# Patient Record
Sex: FEMALE | Race: WHITE | URBAN - METROPOLITAN AREA
[De-identification: names, ages, dates, MRNs, and addresses within clinical notes are randomized per-mention and may not be internally consistent; named-entity substitution may affect disease eponyms.]

---

## 2017-10-30 ENCOUNTER — ED HISTORICAL/CONVERTED ENCOUNTER (OUTPATIENT)
Dept: OTHER | Age: 52
End: 2017-10-30

## 2018-09-07 ENCOUNTER — ED HISTORICAL/CONVERTED ENCOUNTER (OUTPATIENT)
Dept: OTHER | Age: 53
End: 2018-09-07

## 2021-08-18 ENCOUNTER — HOSPITAL ENCOUNTER (OUTPATIENT)
Dept: PHYSICAL THERAPY | Age: 56
Discharge: HOME | End: 2021-08-18
Payer: COMMERCIAL

## 2021-08-18 VITALS — OXYGEN SATURATION: 98 % | DIASTOLIC BLOOD PRESSURE: 82 MMHG | SYSTOLIC BLOOD PRESSURE: 122 MMHG | HEART RATE: 90 BPM

## 2021-08-18 PROCEDURE — 97161 PT EVAL LOW COMPLEX 20 MIN: CPT

## 2021-08-18 PROCEDURE — 97535 SELF CARE MNGMENT TRAINING: CPT

## 2021-08-18 NOTE — PROGRESS NOTES
Statement of Medical Necessity  Eladio North 1965 Today's Date: 8/18/2021 JORDAN: Lifetime   Payor: MARA HARDING / Plan: [address] [place] [address] Avenue / Product Type: PPO /  ME: TBD  Refills: 2           Diagnosis  []   I97.2 Post-Mastectomy Lymphedema []   I87.2 Venous Insufficiency   []   I89.0 Lymphedema, other secondary  []   I83.019 Venous Stasis Ulcer LE, Right   []   I89.9 Unspecified Lymphatic Disorder []   I83.029 Venous Stasis Ulcer LE, Left   [x]   R60.9 Swelling not relieved by elevation [x]   Q82.0 Hereditary/ Congenital Lymphedema   []   C50.211 Malignant neoplasm of breast, Right []   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   []   C50.212 Malignant neoplasm of breast, Left []   L03.115 LE Cellulitis, Right   []    []   L03.116 LE Cellulitis, Left     Recommended Product for Diagnosis as noted above:  Units Upper Extremity Rt Lt Units Lower Extremity Rt Lt    Compression Multi-Layered Bandages   4 Compression Multi-Layered Bandages x x    Circ-Aid, Ready Wrap, Sigvaris Arm   4 Inelastic binders (Circ-Aid, Farrow)  [x]Foot   [x]Below Knee   []Knee   []Thigh x x    Circ-Aid Ready Wrap, Sigvaris hand    Mauricio Hernandez, Leg, night use      Tribute Arm, night use    Tribute Leg, night use      Mauricio Hernandez Arm, night use    Bebo Sleeve Leg/ Foot, night use      Vasopneumatic Compression Pump  []Arm []Arm w/Shoulder  []Arm w/Vest   1 Vasopneumatic Compression Pump  [x]Leg         [x]Trunk       []Pant x x    Gradiant Compression Sleeves & Gloves  Custom/ RM Arm:    []CCL1 []CCL2 []CCL3  Custom/ RM Glove: []CCL1 []CCL2 []CCL3      Gradient Compression Stockings  Custom/ RM Lower Extremity:   [x]CCL1       [x]CCL2         []CCL3   [x]Knee      [x]Thigh        []Full Length x x    Bebo sleeve arm w/ hand, night use    Tribute Wrap, night use      Compression Bra    Compression Vest          Compression Multi-Layered Bandages     The above patient was referred for treatment of Lymphedema due to the diagnosis indicated above. Lymphedema is a progressive and chronic condition. It may cause acute infections, muscle atrophy, discomfort, and connective tissue fibrosis with irreversible tissue damage, decreased ROM in the affected limb and diminished functional mobility possibly interfering with independence and ability to work. Recurrent infections and wound complications that commonly occur with Lymphedema often require hospitalization and extensive wound care, thus increasing medical costs. The patient has received complete decongestive therapy which includes manual lymphatic drainage, lymphedema specific exercises, compression bandaging, and hygiene/skin care. Goals of therapy are to reduce the edema and prevent re-accumulation of fluid with its complications. It is medically necessary for this patient to have daytime garments to control this condition. They will need 2 sets (one set to wear and one set to wash for adequate skin care and wearing time). Garments must be replaced every 4-6 months for effectiveness. There are no substitutes available that offer acceptable compression treatment for this Lymphedema patient. If further information is requested, please contact our certified lymphedema therapists at [phone].     [] Sam Casey, PT, DPT, [place]OXANNE  [x] Cecille Chappell PT, [facility]  [] Deirdre Ryder, PT, DPT, [facility]      Printed MD Name  MD Signature  Date

## 2021-08-18 NOTE — PROGRESS NOTES
[facility]  Lymphedema Clinic and Cancer Rehabilitation  [address] [place] [address].  [address]  [address]      INITIAL EVALUATION    NAME: Louise Hurst AGE: 49 y.o. GENDER: female  DATE: 8/18/2021  REFERRING PHYSICIAN: Roberto Pike MD  HISTORY AND BACKGROUND: Patient referred to clinic for evaluation and treatment of LE lymphedema. Patient reports doppler study was negative for DVT, with MD note indicating abdominal CT indicating no evidence of iliac vein compression, May Thurner syndrome, or IVC compression. Patient reports greater than 10 years of LE edema, with condition worsening over the past year, working from home. Primary Diagnosis:  · Primary lymphedema (Q82.0)  Other Treatment Diagnoses:   Swelling not relieved by elevation ( R60.9 edema)  Morbid Obesity (E66.01)  Date of Onset: 10+ years  Present Symptoms and Functional Limitations: LE skin tightness, increase in size, negative impact on body image, feelings of frustration all limiting participation in social activities, fit of shoes/clothing. Reports lack of understanding of appropriate management of lymphedema. Lymphedema Life Impact Scale: 15/68; 22% impairment. Past Medical History:   Past Medical History:   Diagnosis Date    Ill-defined condition     Lymphedema     Thyroid disease    No past surgical history on file. Current Medications:  Calcitrate 600+ D oral, Vitamin D3, dicyclomine 10 mg, hydrochlorothiazide 12.5 mg, levothyroxine 25 mcg. No current outpatient medications on file. No current facility-administered medications for this encounter. Allergies:  PCN per MD noted. Prior Level of Function/Social/Work History/Personal factors and/or comorbidities impacting plan of care: Patient reports LE edema for 10+ years, becoming worse over the past year. Patient an , currently working from home due to pandemic.   Patient reports recent weight gain.   Living Situation:  Private residence. Trainable Caregiver?: not at this time. Self-care/ADLs: indep     Mobility: indep   Sleeping Arrangement:  bed   Adaptive Equipment Owned: na  Previous Therapy:  Trial of non-medical grade compression socks, did not bring to appointment today. Compression/Lymphedema Equipment:  Non-medical grade OTC compression socks, purchased online. SUBJECTIVE:   Patient reports recent medical work-up due to increase in LE swelling. Reports doppler study/CT abdominal/pelvic area, results as noted above. Patient reports legs have been swollen for 10 years, however, worse over the past year. Reports leg edema only present during daytime hours, then resolving overnight. States recently swelling not resolving overnight. States she does notice some improvement in LE size with wear of OTC compression socks. Patient reports her mother/grandmother have/had similar swelling. States her mom wears compression stockings. Patients goals for therapy: reduce LE swelling.     OBJECTIVE DATA SUMMARY:   EXAMINATION/PRESENTATION/DECISION MAKING:   Pain:  Pain Scale 1: Numeric (0 - 10)  Pain Intensity 1: 2  Pain Location 1: Leg    Skin and Tissue Assessment:  Dermal Status:  [x]   Intact []  Dry   []  Tenuous [] Flaky   []  Wound/lesion present []  Scars   []  Dermatitis    Texture/Consistency:  [x]  Boggy: foot/groin, L LE>R LE []  Pitting Edema   []  Brawny []  Combination   [x]  Fibrotic/Woody: calf, bilateral LE    Pigmentation/Color Change:  []  Normal []  Hemosiderin   []  Red []  Erythematous   [x]  Hyperpigmented: L LE > R LE []  Hyperlipodermatosclerosis   Anomalies: NA  []  Lymphorrhea []  Vesicles   []  Petechiae []  Warty Vercusis   []  Bullae []  Papilloma   Circulatory:  []  CLOVIS []  Varicosities:   [x]  Pulses: dorsal pedal pulses located with doppler  [x]  Vascular studies ruled out DVT in past   []  DVT History    Nails:  [x]   Normal  []   Fungus  Stemmers Sign: positive, R>L  Photos:      Height:   5'4\"  Weight:   169.8 lbs   BMI:    29.1  (36 or greater: adversely affecting lymphedema)  Volumetric Measurements:   Right:  8312.25 mL Left:  8714. 92 mL   % Difference: 4.84%    (See scanned graph)  Range of Motion: bilateral UE/LE WNL AROM  Strength: bilateral UE/LE grossly 5/5 with MMT  Sensation:  Intact to light touch bilateral LE  Mobility:  Bed/Chair Mobility:  Independent  Transfers:  Independent    Sitting Balance:  good Standing Balance:  good   Gait:  Independent  Wheelchair Mobility:  (Other) na   Endurance:  Intact for daily activities, gait community distances Stairs:  (Other) not assessed. Safety:  Patient is alert and oriented:  X 4   Safety awareness:  intact   Fall Risk?:  low   Patient given written fall prevention handout: Yes   Precautions:  Standard lymphedema precautions to include avoiding blood pressure readings, injections and IVs or other procedures/acts that could lead to broken skin on affected area, and avoiding excessive heat, resistive activity or altitude without compression garment    Functional outcomes measure: LLIS    ?  Other PT/OT Primary Functional Limitations:     - CURRENT STATUS: CJ - 20%-39% impaired, limited or restricted    - GOAL STATUS: CI - 1%-19% impaired, limited or restricted    - D/C STATUS:  ---------------To be determined---------------     Physical Therapy Evaluation Charge Determination   History Examination Presentation Decision-Making   MEDIUM  Complexity : 1-2 comorbidities / personal factors will impact the outcome/ POC  MEDIUM Complexity : 3 Standardized tests and measures addressing body structure, function, activity limitation and / or participation in recreation  MEDIUM Complexity : Evolving with changing characteristics  Other outcome measures LLIS  LOW       Based on the above components, the patient evaluation is determined to be of the following complexity level: LOW      Evaluation Time: 8:40-9:00 am    TREATMENT PROVIDED:   1. Treatment description: The patient was educated regarding the role and function of the lymphatic system, pathophysiology of primary lymphedema, and instructed in the lymphedema management protocol of complete decongestive therapy (CDT). This includes skin care to prevent breakdown or infection, lymphedema exercises, custom compression therapy options (bandaging, compression garments, compression pump, Jacquelin Shackle, [employer], [employer], etc. as needed), and decongestion with manual lymphatic drainage as indicated. We discussed the need for consistent compression system for lymphedema management. Patient advised custom flat knit compression stockings for daytime wear indicated after LE decongestion due to foot/ankle lymphedema involvement. Patient advised vasopneumatic pump medically necessary for effective home management upon completion of phase II CDT. Skin care education was performed,applying low pH lotion to extremity using upward strokes to stimulate lymphatic vessels. Pt was given information regarding obtaining bandaging supplies. Patient provided with information folder including further information regarding lymphedema. Treatment time: 9:00-9:40 am  Minutes: 40       Self Care 3      ASSESSMENT:   Seth Parra is a 49 y.o. female who presents with stage II lymphedema. Patient presents with foot/ankle involvement, positive Stemmer's sign bilateral LE, with presentation and history of LE edema, and family history of lymphedema being consistent with primary lymphedema, with swelling present for more than 10 years, progressively worse over the past year. Patient advised learning compression bandaging technique may allow her to remove bandaging at home for showers.   Patient will benefit from complete decongestive therapy (CDT) including manual lymphatic drainage (MLD) technique, short-stretch textile bandages/compression system to decongest limb, and kinesiotaping as appropriate. Patient will receive instruction in proper skin care to recognize signs/symptoms of and prevent infection, therapeutic exercise, and self-MLD for independent home program and restorative lymphatic performance. This care is medically necessary due to the infection risk with lymphedema, and to improve functional activities. CDT is necessary to resolve swelling to allow patient to return to wearing normal clothes/footwear, and prevent worsening of symptoms, such as venous stasis ulcerations, infections, or hospitalizations. Patient will be independent with home program strategies to allow improved ADL ability and mobility and to allow patient to return to greatest functional independence. Rehabilitation potential is considered to be Good. Factors which may influence rehabilitation potential include patient demonstrating ROM/Strength WNL. Patient will benefit from 10-15 physical therapy visits over 12 weeks to optimize improvement in these areas. PLAN OF CARE:   Recommendations and Planned Interventions:  Manual lymph drainage/complete decongestive therapy  Multi-layer compression bandaging (short-stretch)  Compression garment fitting/provision  Lymphedema therapeutic exercise  Education in skin care and lymphedema precautions  Self-MLD education per home program  Self-bandaging education per home program     GOALS  Short term goals  Time frame: 6 weeks  1. Instruct the patient to be independent with proper skin care to prevent future skin breakdown and decrease the potential risk for infections that are associated with Lymphedema. 2. Patient will be independent with a personal lymphedema exercise program to assist with the lymphatic flow and reduce limb volume by 400-600 mL . 3. Patient will understand the signs and symptoms of acute infection. Long term goals  Time frame: 12 weeks  1.    Patient will have knowledge of the compression options and acquire a safe and appropriate daytime and night time compression system to prevent  re-accumulation of fluid. 2.  Patient or family will be able to don/doff garments independently. Garment  system effectiveness will be evaluated prior to discharge for better long-term  management and outcomes. 3.  Improvement in functional outcomes measure by 10% to allow for overall improvement in mobility with reduced pain. 4.   To transition patient to independent restorative phase of CDT. Patient has participated in goal setting and agrees to work toward plan of care. Patient was instructed to call if questions or concerns arise. Thank you for this referral.  Annalisa Abraham, PT, CLT-CRISTIANA   Time Calculation: 60 mins    TREATMENT PLAN EFFECTIVE DATES:   8/18/2021 TO 11/14/2021  I have read the above plan of care for [address] I certify the above prescribed services are required by this patient and are medically necessary.   The above plan of care has been developed in conjunction with the lymphedema/physical therapist.       Physician Signature: ____________________________________Date:______________

## 2021-09-14 ENCOUNTER — HOSPITAL ENCOUNTER (OUTPATIENT)
Dept: PHYSICAL THERAPY | Age: 56
Discharge: HOME | End: 2021-09-14
Payer: COMMERCIAL

## 2021-09-14 PROCEDURE — 97140 MANUAL THERAPY 1/> REGIONS: CPT

## 2021-09-14 NOTE — PROGRESS NOTES
LYMPHEDEMA PT DAILY TREATMENT NOTE - [facility] 2-15    Patient Name: Eladio North  AINB:[id number]  : 1965  [x]  Patient  Verified  Payor: Cliff Recinos / Plan: [address] [place] [address] / Product Type: PPO /    In time:8:45 am  Out time:9:40 am  Total Treatment Time (min): 55  Total Timed Codes (min): 55  1:1 Treatment Time ( only): 54   Visit #:  2    Treatment Area: Primary lymphedema, (Q82.0)    SUBJECTIVE  Pain Level (0-10 scale): 2/10, bilateral LE  Any medication changes, allergies to medications, adverse drug reactions, diagnosis change, or new procedure performed?: [x] No    [] Yes (see summary sheet for update)  Subjective functional status/changes:   [] No changes reported  Patient arrives with compression bandaging supplies obtained to initiate treatment. No changes in medical history. Agreeable to proceeding with treatment. OBJECTIVE     min Therapeutic Exercise:  [] [place] Exercises [x] Remedial Lymphedema Exercise Program--to continue ankle pumps/circumduction, toe flex/ext/abd/add   Rationale: Activate muscle pump to improve lymphatic fluid movement and decrease swelling to improve the patients ability to perform ADL and IADL skills and prevent worsening of swelling over time. 55 min Manual Therapy  Manual Lymphatic Drainage (MLD):  Area to decongest: LE: bilateral   Sequence used and effectiveness: Primary sequence L LE   Skin/wound care/debridement: Reviewed skin care principles:   Performed skin care with low pH lotion following manual lymph principles. Skin care products  Hygiene  Prevention of cellulitis   Applied multi-layer compression bandaging to: Patient arrived with adequate bandage in place that was applied by caregiver. bilateral  lower extremity: below knee    The following multi-layer bandages were applied:  Protouch    Padding layer:  Fleece    Foam:  12 cm roll    Short stretch bandages:   6 cm  8 cm  10 cm     Compression bandaging instructions:  1. Compression bandaging will be applied twice a week by therapist in clinic, with adjustments to be made at home as indicated if bandaging becomes loose or uncomfortable. 2. If tolerated, remain bandaged between appointments with therapist, removing under the following conditionsDO NOT WAIT FOR A RETURN PHONE Ally Eid:    -Numbness/tingling in extremity different from what you have experienced without the bandages in place.  -Compromise in circulation, monitoring blood refill into toes after applying gentle pressure to toes.  -Onset of pain in extremity that is sudden and severe in nature. -Redness, warmth in limb, and/or fever, flu-like symptoms, which may indicate infection. If this occurs, call your doctor right away. If you note a sudden increase in swelling in extremity, with or without redness/warmth, go to the emergency room as soon as possible to have blood clot ruled out. 3. Compression bandaging supplies that can be laundered are the brown compression wraps and any foam applied for padding. Launder in washer/dryer with NO fabric softener, bleach, woolite. Dry on a low heat. 4. Once compression garments are ordered, compression bandaging will be continued until garments arrive for fitting. This process can take several weeks. Rationale: decrease pain, increase tissue extensibility and decrease edema  to improve the patients ability to perform work-related, daily routine, and leisure activities without pain. na   Vasopneumatic Device:    Body Part: [] R [] L [] Bilateral  Pressure: [] Decreased [] Normal [] Increased  Treatment Cycles: [] 1  [] 2  [] 3   Rationale: To improve lymphatic fluid movement and decrease swelling to improve the patients ability to perform ADL and IADL skills and prevent worsening of swelling over time.           With   [] TE   [] TA   [x] MT   [] SC   [] other: Patient Education: [x] Review HEP    [x] MLD Patient Education Reviewed with patient, as well as demonstration and instruction during MLD portion of the session. Continued education in self MLD technique with bathing and skin care. [] Progressed/Changed HEP based on:   [] positioning   [] Kinesiotape   [] Skin care   [] wound care   [] other:   [x] Patient/caregiver in multi-layer bandaging donning principles. , Precautions. and Handout provided. Patient / caregiver re-demonstrated bandaging. [] Yes  [x] No  Compression bandaging/garment precautions reviewed: [x] Yes  [] No         ASSESSMENT/Changes in Function:     Patient tolerated MLD/MLB well, with L LE MLD sequence performed, bilateral knee high MLB applied in clinic today. Patient provided with bandaging instructions as noted above. Patient to continue LE HEP 2x/day, 10 reps, as noted above. Plan to progress treatment based on patient tolerance. Patient unable to don shoes she purchased in larger size due to reduced depth of shoe. Patient agreed to fitting non-skid socks today, with information provided for patient to purchase post op shoes for follow up treatments. Patient will continue to benefit from skilled PT services to  address swelling, analyze compression product fit and use, instruct in home lymphedema management program, measure for compression products and modify and progress therapeutic interventions to attain remaining goals. [x]  See Plan of Care  []  See progress note/recertification  []  See Discharge Summary         Progress towards goals / Updated goals:  Initiated MLD/MLB in clinic today. PLAN  [x]  Upgrade activities as tolerated     [x]  Continue plan of care  []  Update interventions per flow sheet       []  Discharge due to:_  [x]  Other: assess response to MLD/MLB performed today.     Andrea Conklin, PT, [facility] 9/14/2021

## 2021-09-17 ENCOUNTER — HOSPITAL ENCOUNTER (OUTPATIENT)
Dept: PHYSICAL THERAPY | Age: 56
Discharge: HOME | End: 2021-09-17
Payer: COMMERCIAL

## 2021-09-17 PROCEDURE — 97161 PT EVAL LOW COMPLEX 20 MIN: CPT

## 2021-09-17 PROCEDURE — 97530 THERAPEUTIC ACTIVITIES: CPT

## 2021-09-17 PROCEDURE — 97140 MANUAL THERAPY 1/> REGIONS: CPT

## 2021-09-17 NOTE — PROGRESS NOTES
LYMPHEDEMA PT DAILY TREATMENT NOTE - [facility] 2-15    Patient Name: Facundo Varner  Date:2021  : 1965  [x]  Patient  Verified  Payor: Connie Sheridan / Plan: [address] [place] [address] / Product Type: PPO /    In time:8:34 am  Out time:9:38 am  Total Treatment Time (min): 64  Total Timed Codes (min): 64  1:1 Treatment Time ( only): 64   Visit #:  3    Treatment Area: Primary lymphedema, (Q82.0)    SUBJECTIVE  Pain Level (0-10 scale): 2/10, bilateral LE  Any medication changes, allergies to medications, adverse drug reactions, diagnosis change, or new procedure performed?: [x] No    [] Yes (see summary sheet for update)  Subjective functional status/changes:   [] No changes reported  Patient arrives with compression bandaging in place, knee high, as applied previous visit. Patient reports for aspect of bandaging coming loose in places. Agreeable to proceeding with treatment. OBJECTIVE     min Therapeutic Exercise:  [] Tanvir Speedy Exercises [x] Remedial Lymphedema Exercise Program--to continue ankle pumps/circumduction, toe flex/ext/abd/add   Rationale: Activate muscle pump to improve lymphatic fluid movement and decrease swelling to improve the patients ability to perform ADL and IADL skills and prevent worsening of swelling over time. 64 min Manual Therapy  Manual Lymphatic Drainage (MLD):  Area to decongest: LE: bilateral   Sequence used and effectiveness: Primary sequence L LE, full LE. Did not activate IA anastomoses secondary to c/o hand swelling intermittently. Skin/wound care/debridement: Reviewed skin care principles:   Performed skin care with low pH lotion following manual lymph principles. Skin care products  Hygiene  Prevention of cellulitis   Applied multi-layer compression bandaging to: Patient arrived with adequate bandage in place that was applied by caregiver.   bilateral  lower extremity: below knee    The following multi-layer bandages were applied:  Protouch    Transelast-toe wraps bilateral    Padding layer:  Fleece    Foam:  12 cm roll    Short stretch bandages:   6 cm  8 cm  10 cm     Compression bandaging instructions:  1. Compression bandaging will be applied twice a week by therapist in clinic, with adjustments to be made at home as indicated if bandaging becomes loose or uncomfortable. 2. If tolerated, remain bandaged between appointments with therapist, removing under the following conditionsDO NOT WAIT FOR A RETURN PHONE Ally Eid:    -Numbness/tingling in extremity different from what you have experienced without the bandages in place.  -Compromise in circulation, monitoring blood refill into toes after applying gentle pressure to toes.  -Onset of pain in extremity that is sudden and severe in nature. -Redness, warmth in limb, and/or fever, flu-like symptoms, which may indicate infection. If this occurs, call your doctor right away. If you note a sudden increase in swelling in extremity, with or without redness/warmth, go to the emergency room as soon as possible to have blood clot ruled out. 3. Compression bandaging supplies that can be laundered are the brown compression wraps and any foam applied for padding. Launder in washer/dryer with NO fabric softener, bleach, woolite. Dry on a low heat. 4. Once compression garments are ordered, compression bandaging will be continued until garments arrive for fitting. This process can take several weeks. Patient provided with information to obtain Leg gloves by [employer] to allow her to cover bandaging while showering. Item available on [employer] Rationale: decrease pain, increase tissue extensibility and decrease edema  to improve the patients ability to perform work-related, daily routine, and leisure activities without pain. na   Vasopneumatic Device:    Body Part: [] R [] L [] Bilateral  Pressure: [] Decreased [] Normal [] Increased  Treatment Cycles: [] 1  [] 2  [] 3   Rationale:  To improve lymphatic fluid movement and decrease swelling to improve the patients ability to perform ADL and IADL skills and prevent worsening of swelling over time. With   [] TE   [] TA   [x] MT   [] SC   [] other: Patient Education: [x] Review HEP    [x] MLD Patient Education Reviewed with patient, as well as demonstration and instruction during MLD portion of the session. Continued education in self MLD technique with bathing and skin care. [] Progressed/Changed HEP based on:   [] positioning   [] Kinesiotape   [] Skin care   [] wound care   [] other:   [x] Patient/caregiver in multi-layer bandaging donning principles. , Precautions. and Handout provided. Patient / caregiver re-demonstrated bandaging. [] Yes  [x] No  Compression bandaging/garment precautions reviewed: [x] Yes  [] No         ASSESSMENT/Changes in Function:     Patient tolerated MLD/MLB well, with L LE MLD sequence performed, bilateral knee high MLB applied in clinic today. Patient provided with bandaging instructions previous visit as noted above. Patient to continue LE HEP 2x/day, 10 reps, as noted above. Provided with information to obtain Leg Gloves to use to cover bandaging with showers. Plan to progress treatment based on patient tolerance. Patient has obtained post-op shoes to accommodate MLB, with patient wearing shoes into clinic today. Patient agreed to fitting non-skid socks today, with information provided for patient to purchase post op shoes for follow up treatments. Patient will continue to benefit from skilled PT services to  address swelling, analyze compression product fit and use, instruct in home lymphedema management program, measure for compression products and modify and progress therapeutic interventions to attain remaining goals.      [x]  See Plan of Care  []  See progress note/recertification  []  See Discharge Summary         Progress towards goals / Updated goals:  Initiated MLD/MLB in clinic today.    PLAN  [x]  Upgrade activities as tolerated     [x]  Continue plan of care  []  Update interventions per flow sheet       []  Discharge due to:_  [x]  Other: assess response to MLD/MLB performed today.     Chris Jones PT, CLT-CRISTIANA 9/17/2021

## 2021-09-21 ENCOUNTER — HOSPITAL ENCOUNTER (OUTPATIENT)
Dept: PHYSICAL THERAPY | Age: 56
Discharge: HOME | End: 2021-09-21
Payer: COMMERCIAL

## 2021-09-21 PROCEDURE — 97140 MANUAL THERAPY 1/> REGIONS: CPT

## 2021-09-21 NOTE — PROGRESS NOTES
LYMPHEDEMA PT DAILY TREATMENT NOTE - [facility] -15    Patient Name: Brooks Steele  Date:2021  : 1965  [x]  Patient  Verified  Payor: Emmanuel Pa / Plan: [address] [place] [address] / Product Type: PPO /    In time:8:34 am  Out time:9:38 am  Total Treatment Time (min): 64  Total Timed Codes (min): 64  1:1 Treatment Time ( only): 64   Visit #:  4    Treatment Area: Primary lymphedema, (Q82.0)    SUBJECTIVE  Pain Level (0-10 scale): 1/10, bilateral LE  Any medication changes, allergies to medications, adverse drug reactions, diagnosis change, or new procedure performed?: [x] No    [] Yes (see summary sheet for update)  Subjective functional status/changes:   [] No changes reported  Patient arrives with compression bandaging in place, knee high, as applied previous visit. Patient reports toe wraps loose and removed yesterday. Agreeable to proceeding with treatment. OBJECTIVE     min Therapeutic Exercise:  [] Rumalda Million Exercises [x] Remedial Lymphedema Exercise Program--to continue ankle pumps/circumduction, toe flex/ext/abd/add   Rationale: Activate muscle pump to improve lymphatic fluid movement and decrease swelling to improve the patients ability to perform ADL and IADL skills and prevent worsening of swelling over time. 64 min Manual Therapy  Manual Lymphatic Drainage (MLD):  Area to decongest: LE: bilateral   Sequence used and effectiveness: Primary sequence Bilateral LE, full LE. Did not activate IA anastomoses secondary to c/o hand swelling intermittently. Skin/wound care/debridement: Reviewed skin care principles:   Performed skin care with low pH lotion following manual lymph principles. Skin care products  Hygiene  Prevention of cellulitis   Applied multi-layer compression bandaging to: Patient arrived with adequate bandage in place that was applied by caregiver.   bilateral  lower extremity: below knee    The following multi-layer bandages were applied:  Protouch    Transelast-toe wraps bilateral    Padding layer:  Fleece    Foam:  12 cm roll    Short stretch bandages:   6 cm  8 cm  10 cm     Compression bandaging instructions:  1. Compression bandaging will be applied twice a week by therapist in clinic, with adjustments to be made at home as indicated if bandaging becomes loose or uncomfortable. 2. If tolerated, remain bandaged between appointments with therapist, removing under the following conditionsDO NOT WAIT FOR A RETURN PHONE Ally Eid:    -Numbness/tingling in extremity different from what you have experienced without the bandages in place.  -Compromise in circulation, monitoring blood refill into toes after applying gentle pressure to toes.  -Onset of pain in extremity that is sudden and severe in nature. -Redness, warmth in limb, and/or fever, flu-like symptoms, which may indicate infection. If this occurs, call your doctor right away. If you note a sudden increase in swelling in extremity, with or without redness/warmth, go to the emergency room as soon as possible to have blood clot ruled out. 3. Compression bandaging supplies that can be laundered are the brown compression wraps and any foam applied for padding. Launder in washer/dryer with NO fabric softener, bleach, woolite. Dry on a low heat. 4. Once compression garments are ordered, compression bandaging will be continued until garments arrive for fitting. This process can take several weeks. Patient provided with information to obtain Leg gloves by [employer] to allow her to cover bandaging while showering. Item available on [employer] Rationale: decrease pain, increase tissue extensibility and decrease edema  to improve the patients ability to perform work-related, daily routine, and leisure activities without pain. na   Vasopneumatic Device:    Body Part: [] R [] L [] Bilateral  Pressure: [] Decreased [] Normal [] Increased  Treatment Cycles: [] 1  [] 2  [] 3   Rationale:  To improve lymphatic fluid movement and decrease swelling to improve the patients ability to perform ADL and IADL skills and prevent worsening of swelling over time. With   [] TE   [] TA   [x] MT   [] SC   [] other: Patient Education: [x] Review HEP    [x] MLD Patient Education Reviewed with patient, as well as demonstration and instruction during MLD portion of the session. Continued education in self MLD technique with bathing and skin care. [] Progressed/Changed HEP based on:   [] positioning   [] Kinesiotape   [] Skin care   [] wound care   [] other:   [x] Patient/caregiver in multi-layer bandaging donning principles. , Precautions. and Handout provided. Patient / caregiver re-demonstrated bandaging. [] Yes  [x] No  Compression bandaging/garment precautions reviewed: [x] Yes  [] No     Affected Side: bilateral LE   Left (cm) Right (cm)   5th Tuberosity 22.7    22.4      Ankle 27.7    26      Calf 38.3    37.4      Mid Knee             Thigh             Groin             Length                 ASSESSMENT/Changes in Function:     Patient tolerated MLD/MLB well, with L LE MLD sequence performed, bilateral knee high MLB applied in clinic today. Patient provided with bandaging instructions previous visit as noted above. Patient to continue LE HEP 2x/day, 10 reps, as noted above. Provided with information to obtain Leg Gloves to use to cover bandaging with showers. Patient ordered stating scheduled to arrive today. Plan to progress treatment based on patient tolerance. Patient has obtained post-op shoes to accommodate MLB, with patient wearing shoes into clinic today. Patient arrives with appropriate foot wear to accommodate bandaging.     Patient will continue to benefit from skilled PT services to  address swelling, analyze compression product fit and use, instruct in home lymphedema management program, measure for compression products and modify and progress therapeutic interventions to attain remaining goals. [x]  See Plan of Care  []  See progress note/recertification  []  See Discharge Summary         Progress towards goals / Updated goals:  Initiated MLD/MLB in clinic today. PLAN  [x]  Upgrade activities as tolerated     [x]  Continue plan of care  []  Update interventions per flow sheet       []  Discharge due to:_  [x]  Other: assess response to MLD/MLB performed today.     Khadra Nunez PT, CLT-CRISTIANA 9/21/2021

## 2021-09-24 ENCOUNTER — HOSPITAL ENCOUNTER (OUTPATIENT)
Dept: PHYSICAL THERAPY | Age: 56
Discharge: HOME | End: 2021-09-24
Payer: COMMERCIAL

## 2021-09-24 PROCEDURE — 97530 THERAPEUTIC ACTIVITIES: CPT

## 2021-09-24 PROCEDURE — 97140 MANUAL THERAPY 1/> REGIONS: CPT

## 2021-09-24 NOTE — PROGRESS NOTES
LYMPHEDEMA PT DAILY TREATMENT NOTE - [facility] 2-15    Patient Name: Edith Finney  Date:2021  : 1965  [x]  Patient  Verified  Payor: Alisson Chavez / Plan: [address] [place] [address] / Product Type: PPO /    In time:8:40 am  Out time: 10:00 am  Total Treatment Time (min): 80  Total Timed Codes (min): 80  1:1 Treatment Time ([facility] only):80  Visit #:  5    Treatment Area: Primary lymphedema, (Q82.0)    SUBJECTIVE  Pain Level (0-10 scale): 1/10, bilateral LE  Any medication changes, allergies to medications, adverse drug reactions, diagnosis change, or new procedure performed?: [x] No    [] Yes (see summary sheet for update)  Subjective functional status/changes:   [] No changes reported  Patient arrives with compression bandaging in place, knee high, as applied previous visit. Patient reports bandaging tolerated well. Please with treatment response. Agreeable to proceeding with treatment including . OBJECTIVE     min Therapeutic Exercise:  [] Laverle Hotter Exercises [x] Remedial Lymphedema Exercise Program--to continue ankle pumps/circumduction, toe flex/ext/abd/add   Rationale: Activate muscle pump to improve lymphatic fluid movement and decrease swelling to improve the patients ability to perform ADL and IADL skills and prevent worsening of swelling over time. 20  60 min  min Therapeutic activity  Manual Therapy  Manual Lymphatic Drainage (MLD):  Area to decongest: LE: bilateral   Sequence used and effectiveness:  Manual therapy  Primary sequence Bilateral LE, full LE. Did not activate IA anastomoses secondary to c/o hand swelling intermittently. Skin/wound care/debridement: Reviewed skin care principles:   Performed skin care with low pH lotion following manual lymph principles. Skin care products  Hygiene  Prevention of cellulitis   Applied multi-layer compression bandaging to: Patient arrived with adequate bandage in place that was applied by caregiver.   bilateral  lower extremity: below knee    The following multi-layer bandages were applied:  Protouch    Transelast-toe wraps bilateral    Padding layer:  Fleece    Foam:  12 cm roll    Short stretch bandages:   6 cm  8 cm  10 cm     Compression bandaging instructions:  1. Compression bandaging will be applied twice a week by therapist in clinic, with adjustments to be made at home as indicated if bandaging becomes loose or uncomfortable. 2. If tolerated, remain bandaged between appointments with therapist, removing under the following conditionsDO NOT WAIT FOR A RETURN PHONE Ally Eid:    -Numbness/tingling in extremity different from what you have experienced without the bandages in place.  -Compromise in circulation, monitoring blood refill into toes after applying gentle pressure to toes.  -Onset of pain in extremity that is sudden and severe in nature. -Redness, warmth in limb, and/or fever, flu-like symptoms, which may indicate infection. If this occurs, call your doctor right away. If you note a sudden increase in swelling in extremity, with or without redness/warmth, go to the emergency room as soon as possible to have blood clot ruled out. 3. Compression bandaging supplies that can be laundered are the brown compression wraps and any foam applied for padding. Launder in washer/dryer with NO fabric softener, bleach, woolite. Dry on a low heat. 4. Once compression garments are ordered, compression bandaging will be continued until garments arrive for fitting. This process can take several weeks. Patient received Leg gloves by [employer] to allow her to cover bandaging while showering, stating they are working well. Therapeutic activity:         Completed measurements for custom flat knit knee high stockings/toe cap, Juzo [id number], beige silver/black.   Patient also requesting CT stockings in black which she will pay for out of pocket for more prompt deliver to allow her to transition to home management as soon as possible. OOP garment order sent to . Additional stockings to be filed with insurance sent to [facility]. Rationale: decrease pain, increase tissue extensibility and decrease edema  to improve the patients ability to perform work-related, daily routine, and leisure activities without pain. na   Vasopneumatic Device:    Body Part: [] R [] L [] Bilateral  Pressure: [] Decreased [] Normal [] Increased  Treatment Cycles: [] 1  [] 2  [] 3   Rationale: To improve lymphatic fluid movement and decrease swelling to improve the patients ability to perform ADL and IADL skills and prevent worsening of swelling over time. With   [] TE   [x] TA   [x] MT   [] SC   [] other: Patient Education: [x] Review HEP    [x] MLD Patient Education Reviewed with patient, as well as demonstration and instruction during MLD portion of the session. Continued education in self MLD technique with bathing and skin care. [] Progressed/Changed HEP based on:   [] positioning   [] Kinesiotape   [] Skin care   [] wound care   [] other:   [x] Patient/caregiver in multi-layer bandaging donning principles. , Precautions. and Handout provided. Patient / caregiver re-demonstrated bandaging. [] Yes  [] No  Compression bandaging/garment precautions reviewed: [x] Yes  [] No     See scanned compression garment order. ASSESSMENT/Changes in Function:     Patient tolerated MLD/MLB well, with L LE MLD sequence performed, bilateral knee high MLB applied in clinic today. Patient provided with bandaging instructions previous visit as noted above. Patient to continue LE HEP 2x/day, 10 reps, as noted above. Patient showering with successful use of Leg gloves. Plan to progress treatment based on patient tolerance. Patient has obtained more comfortable shoes to accommodate MLB, with patient wearing shoes into clinic today. Patient arrives with appropriate foot wear to accommodate bandaging.  Completed measurements for custom flat knit compression stockings/toe caps, with information sent to [facility] for benefits check regarding vasopneumatic pump for home use, with patient in agreement with this today. Patient will continue to benefit from skilled PT services to  address swelling, analyze compression product fit and use, instruct in home lymphedema management program, measure for compression products and modify and progress therapeutic interventions to attain remaining goals. [x]  See Plan of Care  []  See progress note/recertification  []  See Discharge Summary         Progress towards goals / Updated goals:  GOALS  Short term goals  Time frame: 6 weeks  1. Instruct the patient to be independent with proper skin care to prevent future skin breakdown and decrease the potential risk for infections that are associated with Lymphedema. 9/24/2021 ongoing. 2. Patient will be independent with a personal lymphedema exercise program to assist with the lymphatic flow and reduce limb volume by 400-600 mL . 3. Patient will understand the signs and symptoms of acute infection. Long term goals  Time frame: 12 weeks  1. Patient will have knowledge of the compression options and acquire a safe and       appropriate daytime and night time compression system to prevent             re-accumulation of fluid. 9/24/2020 Completed measurements for custom flat knit compression garments as noted above. 2.  Patient or family will be able to don/doff garments independently. Garment   system effectiveness will be evaluated prior to discharge for better long-term  management and outcomes. 3.  Improvement in functional outcomes measure by 10% to allow for overall improvement in mobility with reduced pain. 4.   To transition patient to independent restorative phase of CDT.      PLAN  [x]  Upgrade activities as tolerated     [x]  Continue plan of care  []  Update interventions per flow sheet       []  Discharge due to:_  [x]  Other: assess response to MLD/MLB performed today.     Cecille Age, PT, CLT-CRISTIANA 9/24/2021

## 2021-09-28 ENCOUNTER — HOSPITAL ENCOUNTER (OUTPATIENT)
Dept: PHYSICAL THERAPY | Age: 56
Discharge: HOME | End: 2021-09-28
Payer: COMMERCIAL

## 2021-09-28 PROCEDURE — 97140 MANUAL THERAPY 1/> REGIONS: CPT

## 2021-09-28 PROCEDURE — 97016 VASOPNEUMATIC DEVICE THERAPY: CPT

## 2021-09-28 NOTE — PROGRESS NOTES
LYMPHEDEMA PT DAILY TREATMENT NOTE - [facility] -15    Patient Name: eBau Hu  Date:2021  : 1965  [x]  Patient  Verified  Payor: Brooke Brito / Plan: [address] [place] [address] / Product Type: PPO /    In time:8:35 am  Out time: 9:35 am  Total Treatment Time (min): 60  Total Timed Codes (min): 60  1:1 Treatment Time ( only):60  Visit #:  6    Treatment Area: Primary lymphedema, (Q82.0)    SUBJECTIVE  Pain Level (0-10 scale): 1/10, bilateral LE  Any medication changes, allergies to medications, adverse drug reactions, diagnosis change, or new procedure performed?: [x] No    [] Yes (see summary sheet for update)  Subjective functional status/changes:   [] No changes reported  Patient arrives with compression bandaging in place, knee high, as applied previous visit. Patient reports bandaging tolerated well. Please with treatment response. Agreeable to proceeding with treatment including . OBJECTIVE     min Therapeutic Exercise:  [] Dave Kim Exercises [x] Remedial Lymphedema Exercise Program--to continue ankle pumps/circumduction, toe flex/ext/abd/add   Rationale: Activate muscle pump to improve lymphatic fluid movement and decrease swelling to improve the patients ability to perform ADL and IADL skills and prevent worsening of swelling over time. 25   min Manual Therapy  Manual Lymphatic Drainage (MLD):  Area to decongest: LE: bilateral   Sequence used and effectiveness:  Manual therapy  DEFERRED due to pump trial in clinic. Primary sequence Bilateral LE, full LE. Did not activate IA anastomoses secondary to c/o hand swelling intermittently. Skin/wound care/debridement: Reviewed skin care principles:   Performed skin care with low pH lotion following manual lymph principles. Skin care products  Hygiene  Prevention of cellulitis   Applied multi-layer compression bandaging to: Patient arrived with adequate bandage in place that was applied by caregiver.   bilateral  lower extremity: below knee    The following multi-layer bandages were applied:  Protouch    Transelast-toe wraps bilateral    Padding layer:  Fleece    Foam:  12 cm roll    Short stretch bandages:   6 cm  8 cm  10 cm     Compression bandaging instructions:  1. Compression bandaging will be applied twice a week by therapist in clinic, with adjustments to be made at home as indicated if bandaging becomes loose or uncomfortable. 2. If tolerated, remain bandaged between appointments with therapist, removing under the following conditionsDO NOT WAIT FOR A RETURN PHONE Ally Eid:    -Numbness/tingling in extremity different from what you have experienced without the bandages in place.  -Compromise in circulation, monitoring blood refill into toes after applying gentle pressure to toes.  -Onset of pain in extremity that is sudden and severe in nature. -Redness, warmth in limb, and/or fever, flu-like symptoms, which may indicate infection. If this occurs, call your doctor right away. If you note a sudden increase in swelling in extremity, with or without redness/warmth, go to the emergency room as soon as possible to have blood clot ruled out. 3. Compression bandaging supplies that can be laundered are the brown compression wraps and any foam applied for padding. Launder in washer/dryer with NO fabric softener, bleach, woolite. Dry on a low heat. 4. Once compression garments are ordered, compression bandaging will be continued until garments arrive for fitting. This process can take several weeks. Patient received Leg gloves by [employer] to allow her to cover bandaging while showering, stating they are working well. Vasopneumatic pump:     Previous visit:   Completed measurements for custom flat knit knee high stockings/toe cap, [employer] 3022/3022SV, beige silver/black.   Patient also requesting CT stockings in black which she will pay for out of pocket for more prompt deliver to allow her to transition to home management as soon as possible. OOP garment order sent to . Additional stockings to be filed with insurance sent to [facility]. Rationale: decrease pain, increase tissue extensibility and decrease edema  to improve the patients ability to perform work-related, daily routine, and leisure activities without pain. 35 minutes   Vasopneumatic Device:    Body Part: [] R [x] L [] Bilateral  Pressure: [] Decreased [x] Normal [] Increased  Treatment Cycles: [x] 1  [] 2  [] 3     Vasopneumatic pump trial completed in clinic with [employer]. Patient had failed trial of basic pump, with trial of Flexitouch pump. Patient tolerated treatment well. Vasopneumatic pump for home use is medically necessary for patient to effectively manage LE lymphedema. Rationale: To improve lymphatic fluid movement and decrease swelling to improve the patients ability to perform ADL and IADL skills and prevent worsening of swelling over time. With   [] TE   [] TA   [x] MT   [] SC   [x] other: Patient Education: [x] Review HEP    [x] MLD Patient Education Reviewed with patient, as well as demonstration and instruction during MLD portion of the session. Continued education in self MLD technique with bathing and skin care. [] Progressed/Changed HEP based on:   [] positioning   [] Kinesiotape   [] Skin care   [] wound care   [] other:   [x] Patient/caregiver in multi-layer bandaging donning principles. , Precautions. and Handout provided. Patient / caregiver re-demonstrated bandaging. [] Yes  [] No  Compression bandaging/garment precautions reviewed: [x] Yes  [] No     See scanned compression garment order. Pain post treatment: 0/10    ASSESSMENT/Changes in Function:     Patient tolerated Flexitouch trial and MLB well. Patient provided with bandaging instructions previous visit as noted above. Patient to continue LE HEP 2x/day, 10 reps, as noted above. Patient showering with successful use of Leg gloves.  Plan to progress treatment based on patient tolerance. Patient has obtained more comfortable shoes to accommodate MLB, with patient wearing shoes into clinic today. Completed measurements for custom flat knit compression stockings/toe caps previous visit. Patient reports making payment over the weekend for 1 pair of custom flat knit stockings to allow her to transition to phase I CDT while awaiting insurance authorization for additional stockings. Patient will continue to benefit from skilled PT services to  address swelling, analyze compression product fit and use, instruct in home lymphedema management program, measure for compression products and modify and progress therapeutic interventions to attain remaining goals. [x]  See Plan of Care  []  See progress note/recertification  []  See Discharge Summary         Progress towards goals / Updated goals:  GOALS  Short term goals  Time frame: 6 weeks  1. Instruct the patient to be independent with proper skin care to prevent future skin breakdown and decrease the potential risk for infections that are associated with Lymphedema. 9/24/2021 ongoing. 2. Patient will be independent with a personal lymphedema exercise program to assist with the lymphatic flow and reduce limb volume by 400-600 mL . 3. Patient will understand the signs and symptoms of acute infection. Long term goals  Time frame: 12 weeks  1. Patient will have knowledge of the compression options and acquire a safe and       appropriate daytime and night time compression system to prevent             re-accumulation of fluid. 9/24/2020 Completed measurements for custom flat knit compression garments as noted above. 2.  Patient or family will be able to don/doff garments independently. Garment   system effectiveness will be evaluated prior to discharge for better long-term  management and outcomes.   3.  Improvement in functional outcomes measure by 10% to allow for overall improvement in mobility with reduced pain. 4.   To transition patient to independent restorative phase of CDT. PLAN  [x]  Upgrade activities as tolerated     [x]  Continue plan of care  []  Update interventions per flow sheet       []  Discharge due to:_  [x]  Other: assess response to MLD/MLB performed today.     Pollo Millan, PT, CLT-[facility]NA 9/28/2021

## 2021-10-01 ENCOUNTER — HOSPITAL ENCOUNTER (OUTPATIENT)
Dept: PHYSICAL THERAPY | Age: 56
Discharge: HOME | End: 2021-10-01
Payer: COMMERCIAL

## 2021-10-01 PROCEDURE — 97140 MANUAL THERAPY 1/> REGIONS: CPT

## 2021-10-01 NOTE — PROGRESS NOTES
LYMPHEDEMA PT DAILY TREATMENT NOTE - [facility] -15    Patient Name: Austin Silvestre  Date:10/1/2021  : 1965  [x]  Patient  Verified  Payor: Darvin Healy / Plan: [address] [place] [address] / Product Type: PPO /    In time:8:45 am  Out time: 9:40 am  Total Treatment Time (min): 55  Total Timed Codes (min): 55  1:1 Treatment Time [facility] only):55  Visit #:  7    Treatment Area: Primary lymphedema, (Q82.0)    SUBJECTIVE  Pain Level (0-10 scale): 1/10, bilateral LE  Any medication changes, allergies to medications, adverse drug reactions, diagnosis change, or new procedure performed?: [x] No    [] Yes (see summary sheet for update)  Subjective functional status/changes:   [] No changes reported  Patient arrives with compression bandaging in place, knee high, as applied previous visit. Patient reports bandaging tolerated well. Please with treatment response. Agreeable to proceeding with treatment. OBJECTIVE     min Therapeutic Exercise:  [] Krysta Bob Exercises [x] Remedial Lymphedema Exercise Program--to continue ankle pumps/circumduction, toe flex/ext/abd/add   Rationale: Activate muscle pump to improve lymphatic fluid movement and decrease swelling to improve the patients ability to perform ADL and IADL skills and prevent worsening of swelling over time. 55   min Manual Therapy  Manual Lymphatic Drainage (MLD):  Area to decongest: LE: bilateral   Sequence used and effectiveness:  Manual therapy   Primary sequence Bilateral LE, full LE. Did not activate IA anastomoses secondary to c/o hand swelling intermittently. Skin/wound care/debridement: Reviewed skin care principles:   Performed skin care with low pH lotion following manual lymph principles. Skin care products  Hygiene  Prevention of cellulitis   Kinesiotape applied single strip dorsal foot bilateral   Applied multi-layer compression bandaging to: Patient arrived with adequate bandage in place that was applied by caregiver.   bilateral  lower extremity: below knee    The following multi-layer bandages were applied:  Protouch    Transelast-toe wraps bilateral    Padding layer:  Fleece    Foam:  12 cm roll    Short stretch bandages:   6 cm  8 cm  10 cm     Compression bandaging instructions:  1. Compression bandaging will be applied twice a week by therapist in clinic, with adjustments to be made at home as indicated if bandaging becomes loose or uncomfortable. 2. If tolerated, remain bandaged between appointments with therapist, removing under the following conditionsDO NOT WAIT FOR A RETURN PHONE Ally Eid:    -Numbness/tingling in extremity different from what you have experienced without the bandages in place.  -Compromise in circulation, monitoring blood refill into toes after applying gentle pressure to toes.  -Onset of pain in extremity that is sudden and severe in nature. -Redness, warmth in limb, and/or fever, flu-like symptoms, which may indicate infection. If this occurs, call your doctor right away. If you note a sudden increase in swelling in extremity, with or without redness/warmth, go to the emergency room as soon as possible to have blood clot ruled out. 3. Compression bandaging supplies that can be laundered are the brown compression wraps and any foam applied for padding. Launder in washer/dryer with NO fabric softener, bleach, woolite. Dry on a low heat. 4. Once compression garments are ordered, compression bandaging will be continued until garments arrive for fitting. This process can take several weeks. Patient received Leg gloves by [employer] to allow her to cover bandaging while showering, stating they are working well. Vasopneumatic pump:     Previous visit:   Completed measurements for custom flat knit knee high stockings/toe cap, [employer] 3022/3022SV, beige silver/black.   Patient also requesting CT stockings in black which she will pay for out of pocket for more prompt deliver to allow her to transition to home management as soon as possible. OOP garment order sent to . Additional stockings to be filed with insurance sent to [facility]. Rationale: decrease pain, increase tissue extensibility and decrease edema  to improve the patients ability to perform work-related, daily routine, and leisure activities without pain. 35 minutes   Vasopneumatic Device:    Body Part: [] R [x] L [] Bilateral  Pressure: [] Decreased [x] Normal [] Increased  Treatment Cycles: [x] 1  [] 2  [] 3     Vasopneumatic pump trial completed in clinic with [employer]. Patient had failed trial of basic pump, with trial of Flexitouch pump. Patient tolerated treatment well. Vasopneumatic pump for home use is medically necessary for patient to effectively manage LE lymphedema. Rationale: To improve lymphatic fluid movement and decrease swelling to improve the patients ability to perform ADL and IADL skills and prevent worsening of swelling over time. With   [] TE   [] TA   [x] MT   [] SC   [x] other: Patient Education: [x] Review HEP    [x] MLD Patient Education Reviewed with patient, as well as demonstration and instruction during MLD portion of the session. Continued education in self MLD technique with bathing and skin care. [] Progressed/Changed HEP based on:   [] positioning   [] Kinesiotape   [] Skin care   [] wound care   [] other:   [x] Patient/caregiver in multi-layer bandaging donning principles. , Precautions. and Handout provided. Patient / caregiver re-demonstrated bandaging. [] Yes  [] No  Compression bandaging/garment precautions reviewed: [x] Yes  [] No     See scanned compression garment order. Pain post treatment: 0/10    ASSESSMENT/Changes in Function:     Patient tolerated MLD/MLB well. Patient provided with bandaging instructions previous visit as noted above. Patient to continue LE HEP 2x/day, 10 reps, as noted above. Patient showering with successful use of Leg gloves.   Patient has obtained more comfortable shoes to accommodate MLB, with patient wearing shoes into clinic today. Completed measurements for custom flat knit compression stockings/toe caps previous visit. Patient reports making payment over the weekend for 1 pair of custom flat knit stockings to allow her to transition to phase I CDT while awaiting insurance authorization for additional stockings. Spoke with BW who report single pair of stockings out for delivery today. To fit in clinic upon receiving. Patient will continue to benefit from skilled PT services to  address swelling, analyze compression product fit and use, instruct in home lymphedema management program, measure for compression products and modify and progress therapeutic interventions to attain remaining goals. [x]  See Plan of Care  []  See progress note/recertification  []  See Discharge Summary         Progress towards goals / Updated goals:  GOALS  Short term goals  Time frame: 6 weeks  1. Instruct the patient to be independent with proper skin care to prevent future skin breakdown and decrease the potential risk for infections that are associated with Lymphedema. 9/24/2021 ongoing. 2. Patient will be independent with a personal lymphedema exercise program to assist with the lymphatic flow and reduce limb volume by 400-600 mL . 3. Patient will understand the signs and symptoms of acute infection. Long term goals  Time frame: 12 weeks  1. Patient will have knowledge of the compression options and acquire a safe and       appropriate daytime and night time compression system to prevent             re-accumulation of fluid. 9/24/2020 Completed measurements for custom flat knit compression garments as noted above. 2.  Patient or family will be able to don/doff garments independently. Garment   system effectiveness will be evaluated prior to discharge for better long-term  management and outcomes.   3.  Improvement in functional outcomes measure by 10% to allow for overall improvement in mobility with reduced pain. 4.   To transition patient to independent restorative phase of CDT. PLAN  [x]  Upgrade activities as tolerated     [x]  Continue plan of care  [x]  Fit compression stockings in clinic upon receiving. []  Discharge due to:_  [x]  Other: assess response to MLD/MLB performed today.     Angélica Chavez, PT, CLT-[facility] 10/1/2021

## 2021-10-04 ENCOUNTER — HOSPITAL ENCOUNTER (OUTPATIENT)
Dept: PHYSICAL THERAPY | Age: 56
Discharge: HOME | End: 2021-10-04
Payer: COMMERCIAL

## 2021-10-04 PROCEDURE — 97140 MANUAL THERAPY 1/> REGIONS: CPT

## 2021-10-04 PROCEDURE — 97760 ORTHOTIC MGMT&TRAING 1ST ENC: CPT

## 2021-10-04 NOTE — PROGRESS NOTES
LYMPHEDEMA PT DAILY TREATMENT NOTE - [facility] 2-15    Patient Name: Lena Maria  Date:10/4/2021  : 1965  [x]  Patient  Verified  Payor: Eddie Vega / Plan: [address] [place] [address] / Product Type: PPO /    In time: 9:30 am  Out time: 10:31 am  Total Treatment Time (min): 61  Total Timed Codes (min): 61  1:1 Treatment Time [facility] only):61  Visit #:  8    Treatment Area: Primary lymphedema, (Q82.0)    SUBJECTIVE  Pain Level (0-10 scale): 1/10, bilateral LE  Any medication changes, allergies to medications, adverse drug reactions, diagnosis change, or new procedure performed?: [x] No    [] Yes (see summary sheet for update)  Subjective functional status/changes:   [x] No changes reported  Patient arrives with compression bandaging in place. States she received initial pair of compression stockings, bringing for fit today. Pleased with treatment response. Agreeable to proceeding with treatment. OBJECTIVE     min Therapeutic Exercise:  [] Eura Cruel Exercises [x] Remedial Lymphedema Exercise Program--to continue ankle pumps/circumduction, toe flex/ext/abd/add   Rationale: Activate muscle pump to improve lymphatic fluid movement and decrease swelling to improve the patients ability to perform ADL and IADL skills and prevent worsening of swelling over time. 20  40   Min  min Manual Therapy  Orthotic management  Manual Lymphatic Drainage (MLD):  Area to decongest: LE: bilateral   Sequence used and effectiveness:  Manual therapy   Primary sequence Bilateral LE, full LE. Did not activate IA anastomoses secondary to c/o hand swelling intermittently. Skin/wound care/debridement: Reviewed skin care principles:   Performed skin care with low pH lotion following manual lymph principles.    Skin care products  Hygiene  Prevention of cellulitis       Limb volume measurements completed with results as follows:                             Right                 Left  10/4/2021        7870.90 mL       8084.18 mL  8/18/2021         8312.25 mL      8714. 92 mL    Limb volume discrepancy of 2.71%, reduced by 4.84% initial evaluation. LE compression bandaging/garments: Patient arrived with adequate bandage in place that was applied by caregiver. bilateral  lower extremity: below knee     Therapist fit custom flat knit Juzo 3022 knee high stockings, with good fit noted. Patient instructed in appropriate don/doff of stockings, including use of donning gloves. Patient able to don/doff stockings indep in clinic, instructing in appropriate technique with don/doff, including effective distribution of fabric on limbs. Patient advised in home management of lymphedema as follows:    Compression garment routine:  1. Apply daytime compression stocking to clean, dry extremity first thing in the morning, EVERY MORNING. 2. Wear compression garments until bedtime, adjusting throughout the day as needed should garment wrinkle or crease. Problem areas can be at joints, and top of garment, ensuring proximal aspect of garment positioned appropriately on extremity. 3. Launder garment nightly either by hand or washing machine in a garment bag or pillowcase. Use mild detergent with NO FABRIC SOFTENER, NO BLEACH, NO WOOLITE. Wash in cool/warm water. 4. Dry garment in dryer on low heat right side out in garment bag or pillowcase. 5. Perform skin care to extremity, cleansing skin daily with soap and water, and using low pH lotion, upward strokes to stimulate lymphatic vessels. 6. Daytime compression grments are NOT safe to sleep in, so remove at bedtime. 7. Perform exercise program with compression garments on. Signs/Symptoms of infection include:  Fever, flu-like symptoms, pain/redness/warmthin extremity, sometimes with increase in swelling present. Stop wearing your compression garment if this occurs. Call your doctor immediately or go to the Emergency room to address potential infection.   Remove compression with changes in circulation, numbness in extremity different from what you may experience when not wearing compression garment, pain, unexplained shortness of breath. Notify clinic if swelling increase noted prior to next scheduled appt. Night time compression may be needed for optimal management of lymphedema. Return in 2 weeks for follow up appt. Vasopneumatic pump:     Previous visit:   Completed measurements for custom flat knit knee high stockings/toe cap, Juzo 3022/3022SV, beige silver/black. Patient also requesting CT stockings in black which she will pay for out of pocket for more prompt deliver to allow her to transition to home management as soon as possible. OOP garment order sent to --stockings fit in clinic today. Additional stockings to be filed with insurance sent to [facility]. Rationale: decrease pain, increase tissue extensibility and decrease edema  to improve the patients ability to perform work-related, daily routine, and leisure activities without pain. Vasopneumatic Device:    Body Part: [] R [x] L [] Bilateral  Pressure: [] Decreased [x] Normal [] Increased  Treatment Cycles: [x] 1  [] 2  [] 3     Vasopneumatic pump trial completed in clinic with [employer]. Patient had failed trial of basic pump, with trial of Flexitouch pump. Patient tolerated treatment well. Vasopneumatic pump for home use is medically necessary for patient to effectively manage LE lymphedema. Rationale: To improve lymphatic fluid movement and decrease swelling to improve the patients ability to perform ADL and IADL skills and prevent worsening of swelling over time. With   [] TE   [] TA   [x] MT   [] SC   [x] other: OA Patient Education: [x] Review HEP    [x] MLD Patient Education Reviewed with patient, as well as demonstration and instruction during MLD portion of the session. Continued education in self MLD technique with bathing and skin care.    [] Progressed/Changed HEP based on:   [] positioning   [] Kinesiotape   [] Skin care   [] wound care   [] other:   [x] Patient/caregiver in multi-layer bandaging donning principles. , Precautions. and Handout provided. Patient / caregiver re-demonstrated bandaging. [] Yes  [] No  Compression garment precautions reviewed: [x] Yes  [] No     See scanned compression garment order. Pain post treatment: 0/10    ASSESSMENT/Changes in Function:     Patient agreeable to proceeding with fit of compression stockings. Note good fit of Juzo 3022 knee high compression stockings, with patient able to don/doff stockings indep in clinic wearing gloves, advised to obtain light weight donning gloves to promote don/doff of stockings while protecting fabric. Patient to continue LE HEP 2x/day, 10 reps, as noted above. Patient instructed to continue nightly skin care as noted above, continue daily wear of stockings, nightly laundering of garments. Reviewed s/s of infection, to seen medical attention from physician with presentation of symptoms. Patient will continue to benefit from skilled PT services to  address swelling, analyze compression product fit and use, instruct in home lymphedema management program, measure for compression products and modify and progress therapeutic interventions to attain remaining goals. [x]  See Plan of Care  []  See progress note/recertification  []  See Discharge Summary         Progress towards goals / Updated goals:  GOALS  Short term goals  Time frame: 6 weeks  1. Instruct the patient to be independent with proper skin care to prevent future skin breakdown and decrease the potential risk for infections that are associated with Lymphedema. 9/24/2021 ongoing. 2. Patient will be independent with a personal lymphedema exercise program to assist with the lymphatic flow and reduce limb volume by 400-600 mL . 3. Patient will understand the signs and symptoms of acute infection. Long term goals  Time frame: 12 weeks  1.    Patient will have knowledge of the compression options and acquire a safe and       appropriate daytime and night time compression system to prevent             re-accumulation of fluid. 9/24/2020 Completed measurements for custom flat knit compression garments as noted above. 10/4/2021 custom flat knit knee high stockings received by patient. 2.  Patient or family will be able to don/doff garments independently. Garment   system effectiveness will be evaluated prior to discharge for better long-term  management and outcomes. 10/4/2021 patient able to don/doff custom flat knit knee high stockings indep in clinic. 3.  Improvement in functional outcomes measure by 10% to allow for overall improvement in mobility with reduced pain. 4.   To transition patient to independent restorative phase of CDT. PLAN  [x]  Upgrade activities as tolerated     [x]  Continue plan of care  [x]  Fit compression stockings in clinic upon receiving. []  Discharge due to:_  [x]  Other: assess response to MLD/MLB performed today.     Carmella Paulino, PT, CLT-CRISTIANA 10/4/2021

## 2021-10-05 ENCOUNTER — APPOINTMENT (OUTPATIENT)
Dept: PHYSICAL THERAPY | Age: 56
End: 2021-10-05
Payer: COMMERCIAL

## 2021-10-07 ENCOUNTER — APPOINTMENT (OUTPATIENT)
Dept: PHYSICAL THERAPY | Age: 56
End: 2021-10-07
Payer: COMMERCIAL

## 2021-10-12 ENCOUNTER — HOSPITAL ENCOUNTER (OUTPATIENT)
Dept: PHYSICAL THERAPY | Age: 56
Discharge: HOME | End: 2021-10-12
Payer: COMMERCIAL

## 2021-10-12 PROCEDURE — 97140 MANUAL THERAPY 1/> REGIONS: CPT

## 2021-10-12 PROCEDURE — 97763 ORTHC/PROSTC MGMT SBSQ ENC: CPT

## 2021-10-12 NOTE — PROGRESS NOTES
LYMPHEDEMA PT DAILY TREATMENT NOTE - [facility] -15    Patient Name: Shreyas Moeller  Date:10/12/2021  : 1965  [x]  Patient  Verified  Payor: Kenzie Echols / Plan: [address] [place] [address] / Product Type: PPO /    In time: 8:40 am  Out time: 9:35 am  Total Treatment Time (min): 55  Total Timed Codes (min): 55  1:1 Treatment Time [facility] only):55  Visit #:  9    Treatment Area: Primary lymphedema, (Q82.0)    SUBJECTIVE  Pain Level (0-10 scale): 1/10, bilateral LE  Any medication changes, allergies to medications, adverse drug reactions, diagnosis change, or new procedure performed?: [x] No    [] Yes (see summary sheet for update)  Subjective functional status/changes:   [x] No changes reported  Patient arrives with compression stockings donned. State stockings are comfortable, finding them a bit long, otherwise pleased with fit. States she is wearing stockings daily, laundering and drying them nightly. Agreeable to proceeding with treatment. OBJECTIVE     min Therapeutic Exercise:  [] Megha Simper Exercises [x] Remedial Lymphedema Exercise Program--to continue ankle pumps/circumduction, toe flex/ext/abd/add   Rationale: Activate muscle pump to improve lymphatic fluid movement and decrease swelling to improve the patients ability to perform ADL and IADL skills and prevent worsening of swelling over time. 42  13   Min  min Manual Therapy  Orthotic management  Manual Lymphatic Drainage (MLD):  Area to decongest: LE: bilateral   Sequence used and effectiveness:  Manual therapy   Primary sequence L LE, full LE. Did not activate IA anastomoses secondary to c/o hand swelling intermittently. Skin/wound care/debridement: Reviewed skin care principles:   Patient performing nightly skin care with low pH lotion following manual lymph principles.    Skin care products  Hygiene  Prevention of cellulitis       Limb volume measurements completed with results as follows:                             Right Left  10/11/2021      7933.08 mL       8338. 85 mL  10/4/2021        7870.90 mL       8084. 18 mL  8/18/2021         8312.25 mL      8714. 92 mL    Limb volume discrepancy of 5.11%, with increase in limb volume measurements since compression garment fit as follows:  R LE by 62.18 mL, L LE by 254.67 mL. Decision made to proceed with measurements for [employer] Tribute full leg night time garment L LE due to elevated limb volumes significant today. Measurements completed, order sent to  with alteration of measurements for custom flat knit daytime stockings, adjusting length only. Orthotic management, sub:    LE compression bandaging/garments: Therapist reassessed fit custom flat knit Juzo 3022 knee high stockings, with changes in length of further stockings documented, reducing overall length of stockings from 38.5 cm to 38 cm for subsequent pairs. Patient able to don/doff stockings indep in clinic, instructing in appropriate technique with don/doff, including effective distribution of fabric on limbs. Patient advised in home management of lymphedema as follows:    Compression garment routine:  1. Apply daytime compression stocking to clean, dry extremity first thing in the morning, EVERY MORNING. 2. Wear compression garments until bedtime, adjusting throughout the day as needed should garment wrinkle or crease. Problem areas can be at joints, and top of garment, ensuring proximal aspect of garment positioned appropriately on extremity. 3. Launder garment nightly either by hand or washing machine in a garment bag or pillowcase. Use mild detergent with NO FABRIC SOFTENER, NO BLEACH, NO WOOLITE. Wash in cool/warm water. 4. Dry garment in dryer on low heat right side out in garment bag or pillowcase. 5. Perform skin care to extremity, cleansing skin daily with soap and water, and using low pH lotion, upward strokes to stimulate lymphatic vessels.    6. Daytime compression grments are NOT safe to sleep in, so remove at bedtime. 7. Perform exercise program with compression garments on. Signs/Symptoms of infection include:  Fever, flu-like symptoms, pain/redness/warmthin extremity, sometimes with increase in swelling present. Stop wearing your compression garment if this occurs. Call your doctor immediately or go to the Emergency room to address potential infection. Remove compression with changes in circulation, numbness in extremity different from what you may experience when not wearing compression garment, pain, unexplained shortness of breath. Notify clinic if swelling increase noted prior to next scheduled appt. Night time compression may be needed for optimal management of lymphedema. Return in 2 weeks for follow up appt. Vasopneumatic pump:     Previous visit:   Completed measurements for custom flat knit knee high stockings/toe cap, Juzo 3022/3022SV, beige silver/black. Patient also requesting CT stockings in black which she will pay for out of pocket for more prompt deliver to allow her to transition to home management as soon as possible. OOP garment order sent to --stockings fit in clinic today. Additional stockings to be filed with insurance sent to [facility]. Rationale: decrease pain, increase tissue extensibility and decrease edema  to improve the patients ability to perform work-related, daily routine, and leisure activities without pain. Vasopneumatic Device:    Body Part: [] R [x] L [] Bilateral  Pressure: [] Decreased [x] Normal [] Increased  Treatment Cycles: [x] 1  [] 2  [] 3     Vasopneumatic pump trial completed in clinic with [employer]. Patient had failed trial of basic pump, with trial of Flexitouch pump. Patient tolerated treatment well. Vasopneumatic pump for home use is medically necessary for patient to effectively manage LE lymphedema. Rationale:  To improve lymphatic fluid movement and decrease swelling to improve the patients ability to perform ADL and IADL skills and prevent worsening of swelling over time. With   [] TE   [] TA   [x] MT   [] SC   [x] other: OA Patient Education: [x] Review HEP    [x] MLD Patient Education Reviewed with patient, as well as demonstration and instruction during MLD portion of the session. Continued education in self MLD technique with bathing and skin care. [] Progressed/Changed HEP based on:   [] positioning   [] Kinesiotape   [] Skin care   [] wound care   [] other:   [x] Patient/caregiver in multi-layer bandaging donning principles. , Precautions. and Handout provided. Patient / caregiver re-demonstrated bandaging. [] Yes  [] No  Compression garment precautions reviewed: [x] Yes  [] No     See scanned compression garment order. Pain post treatment: 0/10    ASSESSMENT/Changes in Function:     Note good fit of Juzo 3022 knee high compression stockings, slight alterations made to length of follow up stockings. Due to L LE limb volume measuring 254.67mL larger than at time of transition to compression stockings, decision made to order full length Tribute for night time compression wear. Patient to continue LE HEP 2x/day, 10 reps, as noted above. Patient instructed to continue nightly skin care as noted above, continue daily wear of stockings, nightly laundering of garments. Reviewed s/s of infection, to seen medical attention from physician with presentation of symptoms. Patient will continue to benefit from skilled PT services to  address swelling, analyze compression product fit and use, instruct in home lymphedema management program, measure for compression products and modify and progress therapeutic interventions to attain remaining goals. [x]  See Plan of Care  []  See progress note/recertification  []  See Discharge Summary         Progress towards goals / Updated goals:  GOALS  Short term goals  Time frame: 6 weeks  1.  Instruct the patient to be independent with proper skin care to prevent future skin breakdown and decrease the potential risk for infections that are associated with Lymphedema. 9/24/2021 ongoing. 10/12/2021 goal met. 2. Patient will be independent with a personal lymphedema exercise program to assist with the lymphatic flow and reduce limb volume by 400-600 mL.  10/12/2021 Overall reduction limb volume R . 35 mL, L LE by 630.74 mL noted 10/4/2021. Rebound in measurements upon transition to compression stockings as noted above. 3. Patient will understand the signs and symptoms of acute infection. 10/12/2021 goal met  Long term goals  Time frame: 12 weeks  1. Patient will have knowledge of the compression options and acquire a safe and       appropriate daytime and night time compression system to prevent             re-accumulation of fluid. 9/24/2020 Completed measurements for custom flat knit compression garments as noted above. 10/4/2021 custom flat knit knee high stockings received by patient. 10/12/2021 ordered full leg Tribute garment for L LE.  2.  Patient or family will be able to don/doff garments independently. Garment   system effectiveness will be evaluated prior to discharge for better long-term  management and outcomes. 10/4/2021 patient able to don/doff custom flat knit knee high stockings indep in clinic. 10/12/2021 awaiting additional daytime garments, including toe caps, night time garment. 3.  Improvement in functional outcomes measure by 10% to allow for overall improvement in mobility with reduced pain. 4.   To transition patient to independent restorative phase of CDT. PLAN  [x]  Upgrade activities as tolerated     [x]  Continue plan of care  [x]  Fit compression stockings in clinic upon receiving. []  Discharge due to:_  [x]  Other: assess response to MLD/MLB performed today.     Evelin Driver, PT, CLT-CRISTIANA 10/12/2021

## 2021-10-15 ENCOUNTER — APPOINTMENT (OUTPATIENT)
Dept: PHYSICAL THERAPY | Age: 56
End: 2021-10-15
Payer: COMMERCIAL

## 2021-10-19 ENCOUNTER — HOSPITAL ENCOUNTER (OUTPATIENT)
Dept: PHYSICAL THERAPY | Age: 56
Discharge: HOME | End: 2021-10-19
Payer: COMMERCIAL

## 2021-10-19 PROCEDURE — 97763 ORTHC/PROSTC MGMT SBSQ ENC: CPT

## 2021-10-19 PROCEDURE — 97530 THERAPEUTIC ACTIVITIES: CPT

## 2021-10-19 NOTE — PROGRESS NOTES
LYMPHEDEMA PT DAILY TREATMENT NOTE - [facility] 2-15    Patient Name: Vern Hutchins  Date:10/19/2021  : 1965  [x]  Patient  Verified  Payor: Efrain Thomson / Plan: [address] [place] [address] / Product Type: PPO /    In time: 8:40 am  Out time: 9:37 am  Total Treatment Time (min): 57  Total Timed Codes (min): 57  1:1 Treatment Time [facility] only):57  Visit #:  10    Treatment Area: Primary lymphedema, (Q82.0)    SUBJECTIVE  Pain Level (0-10 scale): 1/10, bilateral LE, skin tightness  Any medication changes, allergies to medications, adverse drug reactions, diagnosis change, or new procedure performed?: [x] No    [] Yes (see summary sheet for update)  Subjective functional status/changes:   [x] No changes reported  Patient arrives with compression stockings donned. States she is wearing stockings daily, laundering and drying them nightly. Reports receiving call from [facility], stating her insurance does not cover compression garments. Patient frustrated with this. Agreeable to proceeding with treatment. OBJECTIVE     min Therapeutic Exercise:  [] Bobetta Brisk Exercises [x] Remedial Lymphedema Exercise Program--to continue ankle pumps/circumduction, toe flex/ext/abd/add   Rationale: Activate muscle pump to improve lymphatic fluid movement and decrease swelling to improve the patients ability to perform ADL and IADL skills and prevent worsening of swelling over time. 14  42   Min  min Therapeutic activity  Orthotic management  Manual Lymphatic Drainage (MLD):  Area to decongest: LE: bilateral   Sequence used and effectiveness: Deferred MLD   Skin/wound care/debridement: Reviewed skin care principles:   Patient performing nightly skin care with low pH lotion following manual lymph principles.    Skin care products  Hygiene  Prevention of cellulitis       Limb volume measurements completed with results as follows:                             Right                 Left  10/19/2021      7876.98 mL       8239.11 mL  10/11/2021      7933.08 mL       8338. 85 mL  10/4/2021        7870.90 mL       8084. 18 mL  8/18/2021         8312.25 mL      8714. 92 mL    Limb volume discrepancy of 4.6%, with Decrease in limb volume measurements since last seen on 10/12/2021, R LE by 56.1 mL, L LE by 99.74 mL. Orthotic management, sub:    LE compression bandaging/garments: Therapist reassessed fit custom flat knit Juzo 3022 knee high stockings, with changes in length of further stockings documented, reducing overall length of stockings from 38.5 cm to 38 cm for subsequent pairs, established previous visit. Patient able to don/doff stockings indep in clinic, instructing in appropriate technique with don/doff, including effective distribution of fabric on limbs. Therapist contacted [facility] regarding question of insurance coverage, with understanding that code used to bill stockings and uncovered code, with patient reporting that insurance indicated items would be covered with signed order from her physician. Patient provided with billing code for custom compression garments, with patient to contact insurance company. Therapist requested that DME proceed with authorization process of compression garments, which are medically necessary for effective treatment of lymphedema, reducing risk of cellulitis infections which can result in hospitalizations. Patient advised in home management of lymphedema as follows:    Compression garment routine:  1. Apply daytime compression stocking to clean, dry extremity first thing in the morning, EVERY MORNING. 2. Wear compression garments until bedtime, adjusting throughout the day as needed should garment wrinkle or crease. Problem areas can be at joints, and top of garment, ensuring proximal aspect of garment positioned appropriately on extremity. 3. Launder garment nightly either by hand or washing machine in a garment bag or pillowcase.   Use mild detergent with NO FABRIC SOFTENER, NO BLEACH, NO Silver Ayala. Wash in cool/warm water. 4. Dry garment in dryer on low heat right side out in garment bag or pillowcase. 5. Perform skin care to extremity, cleansing skin daily with soap and water, and using low pH lotion, upward strokes to stimulate lymphatic vessels. 6. Daytime compression grments are NOT safe to sleep in, so remove at bedtime. 7. Perform exercise program with compression garments on. Signs/Symptoms of infection include:  Fever, flu-like symptoms, pain/redness/warmthin extremity, sometimes with increase in swelling present. Stop wearing your compression garment if this occurs. Call your doctor immediately or go to the Emergency room to address potential infection. Remove compression with changes in circulation, numbness in extremity different from what you may experience when not wearing compression garment, pain, unexplained shortness of breath. Notify clinic if swelling increase noted prior to next scheduled appt. Night time compression may be needed for optimal management of lymphedema. Return in 2 weeks for follow up appt. Rationale: decrease pain, increase tissue extensibility and decrease edema  to improve the patients ability to perform work-related, daily routine, and leisure activities without pain. Vasopneumatic Device:    Body Part: [] R [x] L [] Bilateral  Pressure: [] Decreased [x] Normal [] Increased  Treatment Cycles: [x] 1  [] 2  [] 3     Vasopneumatic pump trial completed in clinic with [employer]. Patient had failed trial of basic pump, with trial of Flexitouch pump. Patient tolerated treatment well. Vasopneumatic pump for home use is medically necessary for patient to effectively manage LE lymphedema. Rationale: To improve lymphatic fluid movement and decrease swelling to improve the patients ability to perform ADL and IADL skills and prevent worsening of swelling over time.           With   [] TE   [] TA   [x] MT   [] SC   [x] other: OA Patient Education: [x] Review HEP    [x] MLD Patient Education Reviewed with patient, as well as demonstration and instruction during MLD portion of the session. Continued education in self MLD technique with bathing and skin care. [] Progressed/Changed HEP based on:   [] positioning   [] Kinesiotape   [] Skin care   [] wound care   [] other:   [x] Patient/caregiver in multi-layer bandaging donning principles. , Precautions. and Handout provided. Patient / caregiver re-demonstrated bandaging. [] Yes  [] No  Compression garment precautions reviewed: [x] Yes  [] No     See scanned compression garment order. Pain post treatment: 0/10    ASSESSMENT/Changes in Function:     Note good fit of Juzo 3022 knee high compression stockings, slight alterations made to length of follow up stockings. Note improvement in limb volume measurements since seen 10/12/2021, however, bilateral LE remain larger than when compression stockings initially fit. Ok [employer] for night time compression wear and additional compression stockings medically necessary for home management of lymphedema. Patient to continue LE HEP 2x/day, 10 reps, as noted above. Patient instructed to continue nightly skin care as noted above, continue daily wear of stockings, nightly laundering of garments. Reviewed s/s of infection, to seen medical attention from physician with presentation of symptoms. Patient to bring bandaging supplies to next appointment for use as needed. Patient will continue to benefit from skilled PT services to  address swelling, analyze compression product fit and use, instruct in home lymphedema management program, measure for compression products and modify and progress therapeutic interventions to attain remaining goals. [x]  See Plan of Care  []  See progress note/recertification  []  See Discharge Summary         Progress towards goals / Updated goals:  GOALS  Short term goals  Time frame: 6 weeks  1.  Instruct the patient to be independent with proper skin care to prevent future skin breakdown and decrease the potential risk for infections that are associated with Lymphedema. 9/24/2021 ongoing. 10/12/2021 goal met. 2. Patient will be independent with a personal lymphedema exercise program to assist with the lymphatic flow and reduce limb volume by 400-600 mL.  10/12/2021 Overall reduction limb volume R . 35 mL, L LE by 630.74 mL noted 10/4/2021. Rebound in measurements upon transition to compression stockings as noted above. 3. Patient will understand the signs and symptoms of acute infection. 10/12/2021 goal met  Long term goals  Time frame: 12 weeks  1. Patient will have knowledge of the compression options and acquire a safe and       appropriate daytime and night time compression system to prevent             re-accumulation of fluid. 9/24/2020 Completed measurements for custom flat knit compression garments as noted above. 10/4/2021 custom flat knit knee high stockings received by patient. 10/12/2021 ordered full leg Tribute garment for L LE.  2.  Patient or family will be able to don/doff garments independently. Garment   system effectiveness will be evaluated prior to discharge for better long-term  management and outcomes. 10/4/2021 patient able to don/doff custom flat knit knee high stockings indep in clinic. 10/12/2021 awaiting additional daytime garments, including toe caps, night time garment. 3.  Improvement in functional outcomes measure by 10% to allow for overall improvement in mobility with reduced pain. 4.   To transition patient to independent restorative phase of CDT. PLAN  [x]  Upgrade activities as tolerated     [x]  Continue plan of care  [x]  Fit additional compression garments in clinic upon receiving. []  Discharge due to:_  [x]  Other: assess response to MLD/MLB performed today.     Angélica Chavez, PT, CLT-CRISTIANA 10/19/2021

## 2021-10-22 ENCOUNTER — APPOINTMENT (OUTPATIENT)
Dept: PHYSICAL THERAPY | Age: 56
End: 2021-10-22
Payer: COMMERCIAL

## 2021-10-22 ENCOUNTER — HOSPITAL ENCOUNTER (OUTPATIENT)
Dept: PHYSICAL THERAPY | Age: 56
Discharge: HOME | End: 2021-10-22
Payer: COMMERCIAL

## 2021-10-22 PROCEDURE — 97530 THERAPEUTIC ACTIVITIES: CPT

## 2021-10-22 NOTE — PROGRESS NOTES
LYMPHEDEMA PT DAILY TREATMENT NOTE - [facility] 2-15    Patient Name: Mala Tinajero  IYRT:  : 1965  [x]  Patient  Verified  Payor: Reji Araya / Plan: [address] [place] [address] / Product Type: PPO /    In time: 8:35 am  Out time: 9:15 am  Total Treatment Time (min): 40  Total Timed Codes (min): 40  1:1 Treatment Time [facility] only):40  Visit #:  11    Treatment Area: Primary lymphedema, (Q82.0)    SUBJECTIVE  Pain Level (0-10 scale): 1/10, bilateral LE, skin tightness  Any medication changes, allergies to medications, adverse drug reactions, diagnosis change, or new procedure performed?: [x] No    [] Yes (see summary sheet for update)  Subjective functional status/changes:   [x] No changes reported  Patient arrives with compression stockings donned. States she is wearing stockings daily, laundering and drying them nightly. Arrives with bandaging supplies, in agreement with proceeding with self bandaging instructions. OBJECTIVE     min Therapeutic Exercise:  [] Lisa Floras Exercises [x] Remedial Lymphedema Exercise Program--to continue ankle pumps/circumduction, toe flex/ext/abd/add   Rationale: Activate muscle pump to improve lymphatic fluid movement and decrease swelling to improve the patients ability to perform ADL and IADL skills and prevent worsening of swelling over time. 36   Min Therapeutic activity  Manual Lymphatic Drainage (MLD):  Area to decongest: LE: bilateral   Sequence used and effectiveness: Deferred MLD   Skin/wound care/debridement: Reviewed skin care principles:   Patient performing nightly skin care with low pH lotion following manual lymph principles. Skin care products  Hygiene  Prevention of cellulitis     Note hyperlipodermatosclerosis, fibrotic tissue changes bilateral ankles, calf, with hyperpigmentation bilateral LE noted. Foot involvement persist, with fibrotic tissue changes bilateral dorsal foot.        Limb volume measurements completed with results as follows: Right                 Left  10/19/2021      7876.98 mL       8239.11 mL  10/11/2021      7933.08 mL       8338. 85 mL  10/4/2021        7870.90 mL       8084. 18 mL  8/18/2021         8312.25 mL      8714. 92 mL    Limb volume discrepancy of 4.6%, with Decrease in limb volume measurements since last seen on 10/12/2021, R LE by 56.1 mL, L LE by 99.74 mL. Therapeutic activity:  LE compression bandaging/garments: Patient instructed in self bandaging, with demonstration of bandaging knee high. Patient instructed in skin care, application of padding, application of short stretch bandaging with appropriate tension. Patient performs self bandaging, lower leg x 2 in clinic as follows:  -Stockinette  -Fleece foot/ankle  -Foam ankle/calf  -Short stretch 6 cm, 10 cm MTP to tibial turberosity   -Curad tape to secure  -Tubigrip covering tape to protect tape    Patient advised in home management of lymphedema as follows:    Compression garment routine:  1. Apply daytime compression stocking to clean, dry extremity first thing in the morning, EVERY MORNING. 2. Wear compression garments until bedtime, adjusting throughout the day as needed should garment wrinkle or crease. Problem areas can be at joints, and top of garment, ensuring proximal aspect of garment positioned appropriately on extremity. 3. Launder garment nightly either by hand or washing machine in a garment bag or pillowcase. Use mild detergent with NO FABRIC SOFTENER, NO BLEACH, NO WOOLITE. Wash in cool/warm water. 4. Dry garment in dryer on low heat right side out in garment bag or pillowcase. 5. Perform skin care to extremity, cleansing skin daily with soap and water, and using low pH lotion, upward strokes to stimulate lymphatic vessels. 6. Daytime compression grments are NOT safe to sleep in, so remove at bedtime. 7. Perform exercise program with compression garments on.   Apply knee high bandaging alternating 1 night R LE, 2 nights L LE for night time compression. Signs/Symptoms of infection include:  Fever, flu-like symptoms, pain/redness/warmthin extremity, sometimes with increase in swelling present. Stop wearing your compression garment if this occurs. Call your doctor immediately or go to the Emergency room to address potential infection. Remove compression with changes in circulation, numbness in extremity different from what you may experience when not wearing compression garment, pain, unexplained shortness of breath. Notify clinic if swelling increase noted prior to next scheduled appt. Night time compression may be needed for optimal management of lymphedema. Return in 2 weeks for follow up appt. Rationale: decrease pain, increase tissue extensibility and decrease edema  to improve the patients ability to perform work-related, daily routine, and leisure activities without pain. Vasopneumatic Device:    Body Part: [] R [x] L [] Bilateral  Pressure: [] Decreased [x] Normal [] Increased  Treatment Cycles: [x] 1  [] 2  [] 3     Vasopneumatic pump trial completed in clinic with [employer]. Patient had failed trial of basic pump, with trial of Flexitouch pump. Patient tolerated treatment well. Vasopneumatic pump for home use is medically necessary for patient to effectively manage LE lymphedema. Rationale: To improve lymphatic fluid movement and decrease swelling to improve the patients ability to perform ADL and IADL skills and prevent worsening of swelling over time. With   [] TE   [] TA   [x] MT   [] SC   [x] other: OA Patient Education: [x] Review HEP    [x] MLD Patient Education Reviewed with patient, as well as demonstration and instruction during MLD portion of the session. Continued education in self MLD technique with bathing and skin care.    [] Progressed/Changed HEP based on:   [] positioning   [] Kinesiotape   [] Skin care   [] wound care   [] other:   [x] Patient/caregiver in multi-layer bandaging donning principles. , Precautions. and Handout provided. Patient / caregiver re-demonstrated bandaging. [] Yes  [] No  Compression garment precautions reviewed: [x] Yes  [] No     Affected Side: bilateral LE   Left (cm) Right (cm)   5th Tuberosity 21.8    21.9      Ankle 26.4    25      Calf 38.2    37      Mid Knee             Thigh             Groin             Length                 Pain post treatment: 0/10    ASSESSMENT/Changes in Function:     Note good fit of Juzo 3022 knee high compression stockings, slight alterations made to length of follow up stockings. [employer] for night time compression wear, additional compression stockings for daytime wear, and vasopneumatic pump medically necessary for home management of lymphedema. Patient to continue LE HEP 2x/day, 10 reps, as noted above. Patient instructed to continue nightly skin care as noted above, continue daily wear of stockings, nightly laundering of garments. Reviewed s/s of infection, to seen medical attention from physician with presentation of symptoms. Patient to bring bandaging supplies to next appointment for use as needed. Patient will continue to benefit from skilled PT services to  address swelling, analyze compression product fit and use, instruct in home lymphedema management program, measure for compression products and modify and progress therapeutic interventions to attain remaining goals. [x]  See Plan of Care  []  See progress note/recertification  []  See Discharge Summary         Progress towards goals / Updated goals:  GOALS  Short term goals  Time frame: 6 weeks  1. Instruct the patient to be independent with proper skin care to prevent future skin breakdown and decrease the potential risk for infections that are associated with Lymphedema. 9/24/2021 ongoing. 10/12/2021 goal met.   2. Patient will be independent with a personal lymphedema exercise program to assist with the lymphatic flow and reduce limb volume by 400-600 mL.  10/12/2021 Overall reduction limb volume R . 35 mL, L LE by 630.74 mL noted 10/4/2021. Rebound in measurements upon transition to compression stockings as noted above. 3. Patient will understand the signs and symptoms of acute infection. 10/12/2021 goal met  Long term goals  Time frame: 12 weeks  1. Patient will have knowledge of the compression options and acquire a safe and       appropriate daytime and night time compression system to prevent             re-accumulation of fluid. 9/24/2020 Completed measurements for custom flat knit compression garments as noted above. 10/4/2021 custom flat knit knee high stockings received by patient. 10/12/2021 ordered full leg Tribute garment for L LE.  2.  Patient or family will be able to don/doff garments independently. Garment   system effectiveness will be evaluated prior to discharge for better long-term  management and outcomes. 10/4/2021 patient able to don/doff custom flat knit knee high stockings indep in clinic. 10/12/2021 awaiting additional daytime garments, including toe caps, night time garment. 10/22/2021  3. Improvement in functional outcomes measure by 10% to allow for overall improvement in mobility with reduced pain. 4.   To transition patient to independent restorative phase of CDT. PLAN  [x]  Upgrade activities as tolerated     [x]  Continue plan of care  [x]  Fit additional compression garments in clinic upon receiving. []  Discharge due to:_  [x]  Other: assess response to MLD/MLB performed today.     Caro Lewis PT, CLT-CRISTIANA 10/22/2021

## 2021-10-26 ENCOUNTER — APPOINTMENT (OUTPATIENT)
Dept: PHYSICAL THERAPY | Age: 56
End: 2021-10-26
Payer: COMMERCIAL

## 2021-10-29 ENCOUNTER — APPOINTMENT (OUTPATIENT)
Dept: PHYSICAL THERAPY | Age: 56
End: 2021-10-29
Payer: COMMERCIAL

## 2021-11-02 ENCOUNTER — APPOINTMENT (OUTPATIENT)
Dept: PHYSICAL THERAPY | Age: 56
End: 2021-11-02
Payer: COMMERCIAL

## 2021-11-05 ENCOUNTER — HOSPITAL ENCOUNTER (OUTPATIENT)
Dept: PHYSICAL THERAPY | Age: 56
Discharge: HOME | End: 2021-11-05
Payer: COMMERCIAL

## 2021-11-05 PROCEDURE — 97140 MANUAL THERAPY 1/> REGIONS: CPT

## 2021-11-05 NOTE — PROGRESS NOTES
LYMPHEDEMA PT DAILY TREATMENT NOTE - [facility] 2-15    Patient Name: Xavier Flair  Date:2021  : 1965  [x]  Patient  Verified  Payor: Dinora Ashley / Plan: [address] [place] [address] / Product Type: PPO /    In time: 8:40 am  Out time: 9:30 am  Total Treatment Time (min): 50  Total Timed Codes (min): 50  1:1 Treatment Time [facility] only):50  Visit #:  12    Treatment Area: Primary lymphedema, (Q82.0)    SUBJECTIVE  Pain Level (0-10 scale): 0/10, bilateral LE  Any medication changes, allergies to medications, adverse drug reactions, diagnosis change, or new procedure performed?: [x] No    [] Yes (see summary sheet for update)  Subjective functional status/changes:   [x] No changes reported  Patient arrives with compression stockings donned. States she is wearing stockings daily, laundering and drying them nightly. States she is applying compression bandaging, primarily on L LE only for night time compression. Reports Flexitouch vasopneumatic pump has been shipped. Agreeable to proceeding with treatment. OBJECTIVE     min Therapeutic Exercise:  [] [place] Exercises [x] Remedial Lymphedema Exercise Program--to continue ankle pumps/circumduction, toe flex/ext/abd/add   Rationale: Activate muscle pump to improve lymphatic fluid movement and decrease swelling to improve the patients ability to perform ADL and IADL skills and prevent worsening of swelling over time. 48   Min Manual therapy  Manual Lymphatic Drainage (MLD):  Area to decongest: LE: bilateral   Sequence used and effectiveness: Full L LE primary LE sequence. Patient tolerated well. Skin/wound care/debridement: Reviewed skin care principles:   Patient performing nightly skin care with low pH lotion following manual lymph principles. Skin care products  Hygiene  Prevention of cellulitis     Note hyperlipodermatosclerosis, fibrotic tissue changes bilateral ankles, calf, with hyperpigmentation bilateral LE noted.   Foot involvement persist, with fibrotic tissue changes bilateral dorsal foot. Limb volume measurements completed with results as follows:                             Right                 Left  11/5/2021        8185.30 mL       8254.94 mL  10/19/2021      7876.98 mL       8239.11 mL  10/11/2021      7933.08 mL       8338. 85 mL  10/4/2021        7870.90 mL       8084. 18 mL  8/18/2021         8312.25 mL      8714. 92 mL    Limb volume discrepancy of 0.85%, with limb volumes as noted above. Patient advised in home management of lymphedema as follows:    Compression garment routine:  1. Apply daytime compression stocking to clean, dry extremity first thing in the morning, EVERY MORNING. 2. Wear compression garments until bedtime, adjusting throughout the day as needed should garment wrinkle or crease. Problem areas can be at joints, and top of garment, ensuring proximal aspect of garment positioned appropriately on extremity. 3. Launder garment nightly either by hand or washing machine in a garment bag or pillowcase. Use mild detergent with NO FABRIC SOFTENER, NO BLEACH, NO WOOLITE. Wash in cool/warm water. 4. Dry garment in dryer on low heat right side out in garment bag or pillowcase. 5. Perform skin care to extremity, cleansing skin daily with soap and water, and using low pH lotion, upward strokes to stimulate lymphatic vessels. 6. Daytime compression grments are NOT safe to sleep in, so remove at bedtime. 7. Perform exercise program with compression garments on. Apply knee high bandaging alternating 1 night R LE, 2 nights L LE for night time compression. Signs/Symptoms of infection include:  Fever, flu-like symptoms, pain/redness/warmthin extremity, sometimes with increase in swelling present. Stop wearing your compression garment if this occurs. Call your doctor immediately or go to the Emergency room to address potential infection.   Remove compression with changes in circulation, numbness in extremity different from what you may experience when not wearing compression garment, pain, unexplained shortness of breath. Notify clinic if swelling increase noted prior to next scheduled appt. Night time compression may be needed for optimal management of lymphedema. Return in 2 weeks for follow up appt. Rationale: decrease pain, increase tissue extensibility and decrease edema  to improve the patients ability to perform work-related, daily routine, and leisure activities without pain. Vasopneumatic Device: Previous visit    Body Part: [] R [x] L [] Bilateral  Pressure: [] Decreased [x] Normal [] Increased  Treatment Cycles: [x] 1  [] 2  [] 3     Vasopneumatic pump trial completed in clinic with [employer]. Patient had failed trial of basic pump, with trial of Flexitouch pump. Patient tolerated treatment well. Vasopneumatic pump for home use is medically necessary for patient to effectively manage LE lymphedema. Rationale: To improve lymphatic fluid movement and decrease swelling to improve the patients ability to perform ADL and IADL skills and prevent worsening of swelling over time. With   [] TE   [] TA   [x] MT   [] SC   [x] other: OA Patient Education: [x] Review HEP    [x] MLD Patient Education Reviewed with patient, as well as demonstration and instruction during MLD portion of the session. Continued education in self MLD technique with bathing and skin care. [] Progressed/Changed HEP based on:   [] positioning   [] Kinesiotape   [] Skin care   [] wound care   [] other:   [x] Patient/caregiver in multi-layer bandaging donning principles. , Precautions. and Handout provided. Patient / caregiver re-demonstrated bandaging. [] Yes  [] No  Compression garment precautions reviewed: [x] Yes  [] No     Affected Side: bilateral LE  Limb volumes as noted above. Pain post treatment: 0/10     LLIS: 4/68; 6% impairment.       ASSESSMENT/Changes in Function:     Note good fit of Carlos 3022 knee high compression stockings, slight alterations made to length of follow up stockings. Jame Grammes for night time compression wear, additional compression stockings for daytime wear, and vasopneumatic pump medically necessary for home management of lymphedema, with patient advised pump shipped. Patient to continue LE HEP 2x/day, 10 reps, as noted above. Patient instructed to continue nightly skin care as noted above, continue daily wear of stockings, nightly laundering of garments. Reviewed s/s of infection, to seen medical attention from physician with presentation of symptoms. Patient to bring bandaging supplies to next appointment for use as needed. Compression garment order sent to [employer] to obtain authorization from insurance company. Patient will continue to benefit from skilled PT services to  address swelling, analyze compression product fit and use, instruct in home lymphedema management program, measure for compression products and modify and progress therapeutic interventions to attain remaining goals. [x]  See Plan of Care  []  See progress note/recertification  []  See Discharge Summary         Progress towards goals / Updated goals:  GOALS  Short term goals  Time frame: 6 weeks  1. Instruct the patient to be independent with proper skin care to prevent future skin breakdown and decrease the potential risk for infections that are associated with Lymphedema. 9/24/2021 ongoing. 10/12/2021 goal met. 2. Patient will be independent with a personal lymphedema exercise program to assist with the lymphatic flow and reduce limb volume by 400-600 mL.  10/12/2021 Overall reduction limb volume R . 35 mL, L LE by 630.74 mL noted 10/4/2021. Rebound in measurements upon transition to compression stockings as noted above. 11/4/2021 rebound since most recent measurements. See above. 3. Patient will understand the signs and symptoms of acute infection.  10/12/2021 goal met  Long term goals  Time frame: 12 weeks  1. Patient will have knowledge of the compression options and acquire a safe and       appropriate daytime and night time compression system to prevent             re-accumulation of fluid. 9/24/2020 Completed measurements for custom flat knit compression garments as noted above. 10/4/2021 custom flat knit knee high stockings received by patient. 10/12/2021 ordered full leg Tribute garment for L LE.  2.  Patient or family will be able to don/doff garments independently. Garment   system effectiveness will be evaluated prior to discharge for better long-term  management and outcomes. 10/4/2021 patient able to don/doff custom flat knit knee high stockings indep in clinic. 10/12/2021 awaiting additional daytime garments, including toe caps, night time garment. 10/22/2021  3. Improvement in functional outcomes measure by 10% to allow for overall improvement in mobility with reduced pain. 4.   To transition patient to independent restorative phase of CDT. PLAN  [x]  Upgrade activities as tolerated     [x]  Continue plan of care  [x]  Fit additional compression garments in clinic upon receiving. []  Discharge due to:_  [x]  Other: assess response to MLD/MLB performed today.     Stepan Honeycutt, PT, CLT-CRISTIANA 11/5/2021

## 2021-12-10 ENCOUNTER — HOSPITAL ENCOUNTER (OUTPATIENT)
Dept: PHYSICAL THERAPY | Age: 56
Discharge: HOME | End: 2021-12-10
Payer: COMMERCIAL

## 2021-12-10 PROCEDURE — 97164 PT RE-EVAL EST PLAN CARE: CPT

## 2021-12-10 PROCEDURE — 97530 THERAPEUTIC ACTIVITIES: CPT

## 2021-12-10 PROCEDURE — 97760 ORTHOTIC MGMT&TRAING 1ST ENC: CPT

## 2021-12-10 NOTE — PROGRESS NOTES
[address]  [facility]  [address] [facility]  [address]  [place] [address]      RE- EVALUATION    NAME: Louise Hurst AGE: 49 y.o. GENDER: female  DATE: 12/10/2021  REFERRING PHYSICIAN: Margarita Gutierrez NP  HISTORY AND BACKGROUND: Patient initially referred to clinic for evaluation and treatment of LE lymphedema. Patient reports doppler study was negative for DVT, with MD note indicating abdominal CT indicating no evidence of iliac vein compression, May Thurner syndrome, or IVC compression. Patient reports greater than 10 years of LE edema, with condition worsening over the past year, working from home. Patient returns today for re-evaluation of lymphedema and fit of custom compression garments. Primary Diagnosis:  · Primary lymphedema (Q82.0)  Other Treatment Diagnoses:   Swelling not relieved by elevation ( R60.9 edema)  Morbid Obesity (E66.01)  Date of Onset: 10+ years  Present Symptoms and Functional Limitations: fullness lower legs/foot, improved with treatment. Past Medical History:   Past Medical History:   Diagnosis Date    Ill-defined condition     Lymphedema     Thyroid disease    No past surgical history on file. Current Medications:  Calcitrate 600+ D oral, Vitamin D3, dicyclomine 10 mg, hydrochlorothiazide 12.5 mg, levothyroxine 25 mcg. No current outpatient medications on file. No current facility-administered medications for this encounter. Allergies:  PCN per MD noted. Prior Level of Function/Social/Work History/Personal factors and/or comorbidities impacting plan of care: Patient reports LE edema for 10+ years, becoming worse over the past year. Patient an , currently working from home due to pandemic. Patient reports recent weight gain. Progressing well with treatment. Living Situation:  Private residence. Trainable Caregiver?: not at this time.    Self-care/ADLs: indep     Mobility: indep   Sleeping Arrangement:  bed   [employer] Owned: na  Previous Therapy:  Initial evaluation in this clinic 8/18/2021, with phase I CDT received. Patient transitioned to daytime custom flat knit compression stockings, with determination that night time compression and vasopneumatic pump medically necessary for effective management of bilateral LE lymphedema. Compression/Lymphedema Equipment:  Style on Screen 3022 CT knee high custom compression stockings; [id number] OT knee high custom compression stockings paired with custom toe cap: Solaris tribute full leg to wear L LE at this time. SUBJECTIVE:   Patient reports recent receipt of night time compression garment, daytime custom flat knit compression stockings, knee high, bilateral custom flat knit toe caps. Patients goals for therapy: \"make sure everything fits (compression garments) and is working\"    OBJECTIVE DATA SUMMARY:   EXAMINATION/PRESENTATION/DECISION MAKING:   Pain: 0/10             Skin and Tissue Assessment:  Dermal Status:  [x]   Intact []  Dry   []  Tenuous [] Flaky   []  Wound/lesion present []  Scars   []  Dermatitis    Texture/Consistency:  [x]  Boggy: foot/groin, L LE>R LE, Mild []  Pitting Edema   []  Brawny []  Combination   [x]  Fibrotic/Woody: calf, bilateral LE, mild    Pigmentation/Color Change:  []  Normal []  Hemosiderin   []  Red []  Erythematous   []  Hyperpigmented:  []  Hyperlipodermatosclerosis   Anomalies: NA  []  Lymphorrhea []  Vesicles   []  Petechiae []  Warty Vercusis   []  Bullae []  Papilloma   Circulatory:  []  CLOVIS []  Varicosities:   []  Pulses:  [x]  Vascular studies ruled out DVT in past   []  DVT History    Nails:  [x]   Normal  []   Fungus  Stemmers Sign: positive, R>L  (36 or greater: adversely affecting lymphedema)  Volumetric Measurements:   Right: 7909.56 mL 12/10/2021;  8312.25 mL 8/18/2021 Left: 8073. 87 mL 12/10/2021; 8714. 92 mL 8/18/2021   % Difference: 2.08%    (See scanned graph)  Range of Motion: bilateral UE/LE WNL AROM  Sensation:  Intact to light touch bilateral LE  Mobility:  Bed/Chair Mobility:  Independent  Transfers:  Independent    Sitting Balance:  good Standing Balance:  good   Gait:  Independent  Wheelchair Mobility:  (Other) na   Endurance:  Intact for daily activities, gait community distances Stairs:  (Other) not assessed. Safety:  Patient is alert and oriented:  X 4   Safety awareness:  intact   Fall Risk?:  low   Patient given written fall prevention handout: Yes   Precautions:  Standard lymphedema precautions to include avoiding blood pressure readings, injections and IVs or other procedures/acts that could lead to broken skin on affected area, and avoiding excessive heat, resistive activity or altitude without compression garment      TREATMENT PROVIDED:   1. Treatment description: Upper/Lower Extremity Compression: Fitted for the following compression products:    LE: bilateral bilateral lower extremity: Knee high Thigh high   1. Juzo 3022 SV OT, SB knee highs/3022 SV toe cap, closed toe stubs-open toe stubs ordered. 2. Solaris Tribute, full leg, L LE  Style: Flat-knit and Foam based    Brand: [employer]    Type: Custom: Juzo/Solaris Tribute    Vendor: [employer]    Education: Educated patient in compression garment donning and doffing. Glove use with donning. Daily wear schedule. Daily laundering. Garment lifespan. Return and reordering process (by bringing prior garments into the clinic). Educated patient to monitor for redness or pressure points on the skin. If new pain occurs they should contact their therapist.    Patient/family demonstrated donning and doffing with independence     Contacted [employer] regarding need to have toe caps remade due to OT ordered and CT received. Also request second set of daytime custom flat knit garments to be ordered.     Treatment time: 9:05-9:30 am  Minutes: 25       Orthotic management x 2      ASSESSMENT: Daphney Riec is a 49 y.o. female who presents with stage II lymphedema. Patient presented with foot/ankle involvement, positive Stemmer's sign bilateral LE, at time of initial evaluation, with presentation and history of LE edema, and family history of lymphedema being consistent with primary lymphedema, with swelling present for more than 10 years, progressively worse over the past year. Fit complete of custom flat knit Juzo 3022SV OT, SB knee thigh stockings, and full leg Solaris Tribute night time garment L LE. Note toe cap fabricated with CT vs OT as ordered. Photos of tags sent to [employer] to have items remade. Requested second set of daytime custom flat knit compression garments be ordered at this time. Good fit of Solaris Tribute R LE, full leg, with effective compression marks noted upon fit and removal of compression garment in clinic. Patient reports Flexitouch sequence daily, tolerating well. Patient progressing well with home management of lymphedema, phase II CDT. This care is medically necessary due to the infection risk with lymphedema, and to improve functional activities. CDT is necessary to resolve swelling to allow patient to return to wearing normal clothes/footwear, and prevent worsening of symptoms, such as venous stasis ulcerations, infections, or hospitalizations. Patient will be independent with home program strategies to allow improved ADL ability and mobility and to allow patient to return to greatest functional independence. Rehabilitation potential is considered to be Good. Factors which may influence rehabilitation with patient progressing well with home management of lymphedema. Patient will benefit from 4-6 physical therapy visits over 12 weeks to optimize improvement in these areas.     PLAN OF CARE:   Recommendations and Planned Interventions:  Manual lymph drainage/complete decongestive therapy  Multi-layer compression bandaging (short-stretch)  Compression garment fitting/provision  Lymphedema therapeutic exercise  Education in skin care and lymphedema precautions  Self-MLD education per home program  Self-bandaging education per home program     GOALS  Short term goals  Time frame: 6-12 weeks  1. Patient will have knowledge of all ordered/received compression options and acquire a safe and appropriate daytime and night time compression system to prevent  re-accumulation of fluid. 2.  Patient or family will be able to don/doff garments independently. Garment  system effectiveness will be evaluated prior to discharge for better long-term  management and outcomes. 3.   To transition patient to independent restorative phase of CDT. Patient has participated in goal setting and agrees to work toward plan of care. Patient was instructed to call if questions or concerns arise. Thank you for this referral.  Grecia Lawrence PT, ANTONIOCRISTIANA   Time Calculation: 40 mins    TREATMENT PLAN EFFECTIVE DATES:   12/10/2021 TO 3/5/2022  I have read the above plan of care for [address] I certify the above prescribed services are required by this patient and are medically necessary.   The above plan of care has been developed in conjunction with the lymphedema/physical therapist.       Physician Signature: ____________________________________Date:______________

## 2022-01-14 ENCOUNTER — HOSPITAL ENCOUNTER (OUTPATIENT)
Dept: PHYSICAL THERAPY | Age: 57
Discharge: HOME | End: 2022-01-14
Payer: COMMERCIAL

## 2022-01-14 PROCEDURE — 97763 ORTHC/PROSTC MGMT SBSQ ENC: CPT

## 2022-01-14 PROCEDURE — 97140 MANUAL THERAPY 1/> REGIONS: CPT

## 2022-01-14 NOTE — PROGRESS NOTES
LYMPHEDEMA PT DAILY TREATMENT NOTE - [facility] -15    Patient Name: Charlene Jackson  Date:2022  : 1965  [x]  Patient  Verified  Payor: Armando Huber / Plan: [address] [place] [address] / Product Type: PPO /    In time: 9:50 am  Out time: 10:20 am  Total Treatment Time (min): 30  Total Timed Codes (min): 30  1:1 Treatment Time [facility] only):30  Visit #:  2  Treatment Area: Primary lymphedema, (Q82.0)    SUBJECTIVE  Pain Level (0-10 scale): 0/10, bilateral LE  Any medication changes, allergies to medications, adverse drug reactions, diagnosis change, or new procedure performed?: [x] No    [] Yes (see summary sheet for update)  Subjective functional status/changes:   [x] No changes reported  Patient arrives with compression stockings donned. States she is wearing stockings daily, laundering and drying them nightly. States is performing Flexitouch vasopneumatic pump sequence daily, less consistent with night time compression garment wear. States she is pleased with progress she is making with lymphedema management. Agreeable to proceeding with treatment. OBJECTIVE     min Therapeutic Exercise:  [] [place] Exercises [x] Remedial Lymphedema Exercise Program--to continue ankle pumps/circumduction, toe flex/ext/abd/add   Rationale: Activate muscle pump to improve lymphatic fluid movement and decrease swelling to improve the patients ability to perform ADL and IADL skills and prevent worsening of swelling over time. 15   Min Manual therapy  Manual Lymphatic Drainage (MLD):  Area to decongest: LE: bilateral   Sequence used and effectiveness: Full L LE primary LE sequence. Patient tolerated well. Skin/wound care/debridement: Patent to continue:  Patient performing nightly skin care with low pH lotion following manual lymph principles.    Skin care products  Hygiene  Prevention of cellulitis     Limb volume measurements completed with results as follows:                             Right Left  1/14/2022        7875.68 mL       8052.64 mL  11/5/2021        8185.30 mL       8254.94 mL  10/19/2021      7876.98 mL       8239.11 mL  10/11/2021      7933.08 mL       8338. 85 mL  10/4/2021        7870.90 mL       8084. 18 mL  8/18/2021         8312.25 mL      8714. 92 mL    Limb volume discrepancy of 2.25%, with limb volumes as noted above. Rationale: decrease pain, increase tissue extensibility and decrease edema  to improve the patients ability to perform work-related, daily routine, and leisure activities without pain. Orthotic management, sub:   Day/night time garments fitting patient well. Patient has 3 pairs of custom flat knit daytime stockings as follows:  1 pair of CT knee highs  2 pair of OT knee highs  2 pair of OT toe caps    Patient advised in home management of lymphedema as follows:    Compression garment routine:  1. Apply daytime compression stocking to clean, dry extremity first thing in the morning, EVERY MORNING. 2. Wear compression garments until bedtime, adjusting throughout the day as needed should garment wrinkle or crease. Problem areas can be at joints, and top of garment, ensuring proximal aspect of garment positioned appropriately on extremity. 3. Launder garment nightly either by hand or washing machine in a garment bag or pillowcase. Use mild detergent with NO FABRIC SOFTENER, NO BLEACH, NO WOOLITE. Wash in cool/warm water. 4. Dry garment in dryer on low heat right side out in garment bag or pillowcase. 5. Perform skin care to extremity, cleansing skin daily with soap and water, and using low pH lotion, upward strokes to stimulate lymphatic vessels. Perform pump sequence, then don night time garments. 6. Daytime compression garments are NOT safe to sleep in, so remove at bedtime. 7. Perform exercise program with compression garments on.   Signs/Symptoms of infection include:  Fever, flu-like symptoms, pain/redness/warmthin extremity, sometimes with increase in swelling present. Stop wearing your compression garment if this occurs. Call your doctor immediately or go to the Emergency room to address potential infection. Remove compression with changes in circulation, numbness in extremity different from what you may experience when not wearing compression garment, pain, unexplained shortness of breath. Notify clinic if swelling increase noted prior to next scheduled appt. Night time compression may be needed for optimal management of lymphedema. Return in 2 weeks for follow up appt. Rationale: To improve lymphatic fluid movement and decrease swelling to improve the patients ability to perform ADL and IADL skills and prevent worsening of swelling over time. With   [] TE   [] TA   [x] MT   [] SC   [x] other: OA Patient Education: [x] Review HEP    [x] MLD Patient Education Reviewed with patient, as well as demonstration and instruction during MLD portion of the session. Continued education in self MLD technique with bathing and skin care. [] Progressed/Changed HEP based on:   [] positioning   [] Kinesiotape   [] Skin care   [] wound care   [] other:   [x] Patient/caregiver in multi-layer bandaging donning principles. , Precautions. and Handout provided. Patient / caregiver re-demonstrated bandaging. [] Yes  [] No  Compression garment precautions reviewed: [x] Yes  [] No     Affected Side: bilateral LE  Limb volumes as noted above. Pain post treatment: 0/10     LLIS: 5/68; 15/68 at time of initial evaluation    ASSESSMENT/Changes in Function:     Note good fit of Juzo 3022 knee high compression stockings/toe caps. Patient instructed to continue nightly skin care as noted above, continue daily wear of stockings, nightly laundering of garments. Reviewed s/s of infection, to seen medical attention from physician with presentation of symptoms.  Patient to continue vasopneumatic pump use daily, work to wear night time compression garments nightly. Overall reduction in limb volume measurements as follows: R .57 mL; L . 28 mL. Patient to notify clinic of any questions or concerns prior to return in 6 months. Patient agreeable to discharge at this time. []  See Plan of Care  []  See progress note/recertification  [x]  See Discharge Summary         Progress towards goals / Updated goals:  GOALS  Short term goals  Time frame: 6 weeks  1. Instruct the patient to be independent with proper skin care to prevent future skin breakdown and decrease the potential risk for infections that are associated with Lymphedema. 9/24/2021 ongoing. 10/12/2021 goal met. 2. Patient will be independent with a personal lymphedema exercise program to assist with the lymphatic flow and reduce limb volume by 400-600 mL.  10/12/2021 Overall reduction limb volume R . 35 mL, L LE by 630.74 mL noted 10/4/2021. Rebound in measurements upon transition to compression stockings as noted above. 11/4/2021 rebound since most recent measurements. See above. 1/14/2022 goal met. 3. Patient will understand the signs and symptoms of acute infection. 10/12/2021 goal met  Long term goals  Time frame: 12 weeks  1. Patient will have knowledge of the compression options and acquire a safe and       appropriate daytime and night time compression system to prevent             re-accumulation of fluid. 9/24/2020 Completed measurements for custom flat knit compression garments as noted above. 10/4/2021 custom flat knit knee high stockings received by patient. 10/12/2021 ordered full leg Tribute garment for L LE. 1/14/2022 goal met. 2.  Patient or family will be able to don/doff garments independently. Garment   system effectiveness will be evaluated prior to discharge for better long-term  management and outcomes.  10/4/2021 patient able to don/doff custom flat knit knee high stockings indep in clinic. 10/12/2021 awaiting additional daytime garments, including toe caps, night time garment. 1/14/2022 goal met  3. Improvement in functional outcomes measure by 10% to allow for overall improvement in mobility with reduced pain. 1/14/2022 goal met. 4.   To transition patient to independent restorative phase of CDT. 1/14/2022 goal met  PLAN  []  Upgrade activities as tolerated     []  Continue plan of care  []  Fit additional compression garments in clinic upon receiving. [x]  Discharge due to: all goals met, patient comfortable to home management of bilateral LE lymphedema. []  Other: assess response to MLD/MLB performed today.     Geovani Dudley, PT, CLT-CRISTIANA 1/14/2022

## 2022-07-15 ENCOUNTER — HOSPITAL ENCOUNTER (OUTPATIENT)
Dept: PHYSICAL THERAPY | Age: 57
Discharge: HOME | End: 2022-07-15
Payer: COMMERCIAL

## 2022-07-15 PROCEDURE — 97161 PT EVAL LOW COMPLEX 20 MIN: CPT

## 2022-07-15 PROCEDURE — 97760 ORTHOTIC MGMT&TRAING 1ST ENC: CPT

## 2022-07-15 NOTE — PROGRESS NOTES
[facility]  [address] [place] [address]  [address]  [address]      EVALUATION    NAME: Louise Hurst AGE: 56 y.o. GENDER: female  DATE: 7/15/2022  REFERRING PHYSICIAN: Geraldine Sanford MD  HISTORY AND BACKGROUND: Patient initially referred to clinic for evaluation and treatment of LE lymphedema. Patient reports doppler study was negative for DVT, with MD note indicating abdominal CT indicating no evidence of iliac vein compression, May Thurner syndrome, or IVC compression. Patient reports greater than 11 years of LE edema, with condition worsening prior to treatment at this clinic from 8/2021-12/2021, working from home. Patient returns today for re-evaluation of lymphedema and fit of custom compression garments. Primary Diagnosis:  · Primary lymphedema (Q82.0)  Other Treatment Diagnoses:   Swelling not relieved by elevation ( R60.9 edema)  Morbid Obesity (E66.01)  Date of Onset: 10+ years  Present Symptoms and Functional Limitations: fullness lower legs/foot, improved with treatment. Reports body image and proper fit of clothing/shoes negatively impacted by lymphedema. LLIS: 12/68; 18% impairment  Past Medical History:   Past Medical History:   Diagnosis Date    Ill-defined condition     Lymphedema     Thyroid disease    No past surgical history on file. Current Medications:  Calcitrate 600+ D oral, Vitamin D3, dicyclomine 10 mg, hydrochlorothiazide 12.5 mg, levothyroxine 25 mcg. No current outpatient medications on file. No current facility-administered medications for this encounter. Allergies:  PCN per MD noted. Prior Level of Function/Social/Work History/Personal factors and/or comorbidities impacting plan of care: Patient reports LE edema for 11+ years, responding well to treatment. 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   Patient reports recent onset of anxiety when driving on busy roads. Does have name of counselor that she plans to contact. Patient provides care for her parents, reporting multiple hospitalizations with them recently. Living Situation:  Private residence. Trainable Caregiver?: not at this time. Self-care/ADLs: indep     Mobility: indep   Sleeping Arrangement:  bed   Adaptive Equipment Owned: na  Previous Therapy:  Initial evaluation in this clinic 8/18/2021, with phase I CDT received. Patient transitioned to daytime custom flat knit compression stockings, with determination that night time compression and vasopneumatic pump medically necessary for effective management of bilateral LE lymphedema. Patient has been returning for recommended follow up appointments. Compression/Lymphedema Equipment:  Treato 3022 CT knee high custom compression stockings 3year old; [id number] OT knee high custom compression stockings paired with custom toe cap over 6 months old: Solaris tribute full leg to wear L LE at this time. Flexitouch vasopneumatic pump for home use. SUBJECTIVE:   Patient reports she is continuing with daily wear of custom flat knit knee high compression stockings, daily use of vasopneumatic pump. States she has been less compliant with wear of Solaris Tribute full leg garment night time wear L LE, however, is working on returning to nightly wear. Patient reports new onset of anxiety as noted above, encouraged to contact counselor and request virtual visits to improve compliance. Patient in agreement with recommendation. Patients goals for therapy: \"I need new stockings. \"    OBJECTIVE DATA SUMMARY:   EXAMINATION/PRESENTATION/DECISION MAKING:   Pain: 0/10             Skin and Tissue Assessment:  Dermal Status:  [x]   Intact []  Dry   []  Tenuous [] Flaky   []  Wound/lesion present []  Scars   []  Dermatitis    Texture/Consistency:  [x]  Boggy: foot/groin, L LE>R LE, Mild []  Pitting Edema   []  Brawny []  Combination [x]  Fibrotic/Woody: calf, bilateral LE, mild    Pigmentation/Color Change: NA  []  Normal []  Hemosiderin   []  Red []  Erythematous   []  Hyperpigmented:  []  Hyperlipodermatosclerosis   Anomalies: NA  []  Lymphorrhea []  Vesicles   []  Petechiae []  Warty Vercusis   []  Bullae []  Papilloma   Circulatory:  []  CLOVIS []  Varicosities:   [x]  Pulses: Dorsal pedal palpable bilateral [x]  Vascular studies ruled out DVT in past   []  DVT History    Nails:  [x]   Normal  []   Fungus  Stemmers Sign: positive, R>L  (36 or greater: adversely affecting lymphedema)  Volumetric Measurements:   Right: 8193.89 mL 7/15/2022; 7909.56 mL 12/10/2021;  8312.25 mL 8/18/2021 Left: 8603.54 mL 7/15/2022; 8073. 87 mL 12/10/2021; 8714. 92 mL 8/18/2021   % Difference: 5.00% L LE>R LE   (See scanned graph)  Range of Motion: bilateral UE/LE WNL AROM  Strength: Bilateral LE grossly 5/5 with MMT. Sensation:  Intact to light touch bilateral LE  Mobility:  Bed/Chair Mobility:  Independent  Transfers:  Independent    Sitting Balance:  good Standing Balance:  good   Gait:  Independent  Wheelchair Mobility:  (Other) na   Endurance:  Intact for daily activities, gait community distances Stairs:  (Other) not assessed. Safety:  Patient is alert and oriented:  X 4   Safety awareness:  intact   Fall Risk?:  low   Patient given written fall prevention handout: Yes   Precautions:  Standard lymphedema precautions to include avoiding blood pressure readings, injections and IVs or other procedures/acts that could lead to broken skin on affected area, and avoiding excessive heat, resistive activity or altitude without compression garment    Evaluation time: 9:36-9:58 am    22 minutes            TREATMENT PROVIDED:   1.   Treatment description:   Lower Extremity Compression: Measured for the following compression products:    LE: bilateral bilateral lower extremity: Knee high Top band silicone border Closed toe    Style: Flat-knit    Brand: Juzo    Type: Custom: 5817 Black    Vendor: [id number]    Education: Educated patient in compression garment donning and doffing. Daily wear schedule. Daily laundering. Garment lifespan. Return and reordering process (by bringing prior garments into the clinic). Educated patient to monitor for redness or pressure points on the skin. If new pain occurs they should contact their therapist.    Patient/family demonstrated donning and doffing with independence    Patient to fit garments at home, notifying clinic of any concerns regarding fit/effectiveness promptly to allow for return/alterations. Patient advised to begin walking program, 30 minutes 4 days/week with compression stockings donned. Advised to benefit of exercise in lymphedema management, weigh management and mental health, with patient in agreement with recommendation. Treatment time: 10:00-10:30 am  Minutes: 30     Orthotic management x 2      ASSESSMENT:   Romeo Rivera is a 56 y.o. female who presents with stage II lymphedema, previously seen in clinic for phase I CDT treatment bilateral LEs. Patient presented with foot/ankle involvement, positive Stemmer's sign bilateral LE, at time of initial evaluation, with presentation and history of LE edema, and family history of lymphedema being consistent with primary lymphedema, with swelling present for more than 11 years, progressively worse 1 year prior to initial evaluation in this clinic 8/18/2021. Patient responded well to phase I treatment including MLD/MLB/Exercise/skin care. Patient transitioning to custom flat knit Juzo [id number] OT, SB knee thigh stockings, and full leg Solaris Tribute night time garment L LE. Then fit second set of custom flat knit stockings/toe caps in black. Patient requests ordering CT custom flat knit stockings at this time. Patient reports Flexitouch sequence daily, tolerating well. Patient progressing well with home management of lymphedema, phase II CDT.     This care is medically necessary due to the infection risk with lymphedema, and to improve functional activities. CDT is necessary to resolve swelling to allow patient to return to wearing normal clothes/footwear, and prevent worsening of symptoms, such as venous stasis ulcerations, infections, or hospitalizations. Patient will be independent with home program strategies to allow improved ADL ability and mobility and to allow patient to return to greatest functional independence. Rehabilitation potential is considered to be Good. Patient to fit replacement custom flat knit compression stockings at home, notifying clinic of any concerns promptly to allow garments to be altered/remade as indicated. Recommend patient return to clinic 3 visits over 12 weeks for compression garment management. PLAN OF CARE: Provision of appropriately fitting compression stockings bilateral LE    Goals: 12 weeks    1. Patient will have knowledge of the compression options and acquire a safe and appropriate daytime and night time compression system to prevent    re-accumulation of fluid. 2.  Patient or family will be able to don/doff garments independently. Garment system effectiveness will be evaluated prior to discharge for better long-term   management and outcomes. 3.   To transition patient to independent restorative phase of CDT. Patient has participated in goal setting and agrees to work toward plan of care. Patient was instructed to call if questions or concerns arise. Thank you for this referral.  Misti Sol, PT, ODIN-CRISTIANA   Time Calculation: 54 mins    TREATMENT PLAN EFFECTIVE DATES:   7/15/2022 TO 10/13/2022  I have read the above plan of care for [address] I certify the above prescribed services are required by this patient and are medically necessary.   The above plan of care has been developed in conjunction with the lymphedema/physical therapist.       Physician Signature: ____________________________________Date:______________

## 2022-07-15 NOTE — PROGRESS NOTES
Statement of Medical Necessity  Page 1 of 2      Louise Hurst 1965 Today's Date: 7/15/2022 JORDAN: [employer]   Payor: MARA HARDING / Plan: [address] [place] [address] Avenue / Product Type: PPO /  ME: TBD  Refills: 2               Diagnosis  []   I97.2 Post-Mastectomy Lymphedema []   I87.2 Venous Insufficiency   []   I89.0 Lymphedema, other secondary  []   I83.019 Venous Stasis Ulcer LE, Right   []   I89.9 Unspecified Lymphatic Disorder []   I83.029 Venous Stasis Ulcer LE, Left   [x]   R60.9 Swelling not relieved by elevation [x]   Q82.0 Hereditary/ Congenital Lymphedema   []   C50.211 Malignant neoplasm of breast, Right []   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   []   C50.212 Malignant neoplasm of breast, Left []   L03.115 LE Cellulitis, Right   []    []   L03.116 LE Cellulitis, Left                                                           Louise Hoskinsmon    1965  Page 2 of 2    Physician Order for DME for Diagnosis of Q82.0, primary lymphedema,  as Listed on Statement of Medical Necessity, Page 1        Recommended Product:  Units Upper Extremity Rt Lt Units Lower Extremity Rt Lt    Circ-Aid, Ready Wrap, Sigvaris Arm    Inelastic binders (Circ-Aid, Farrow)  []Foot   []Below Knee   []Knee   []Thigh      Circ-Aid Ready Wrap, Sigvaris hand    Mauricio Hernandez, night use []Full Leg  []Lower Leg      Tribute Arm, night use    Tribute, night use  []Full Leg  []Lower Leg      Mauricio Hernandez Arm, night use    Bebo Sleeve Leg/ Foot, night use      Gradiant Compression Sleeves & Gloves  []Custom [] RM Arm:  []CCL1 []CCL2 []CCL3  []Custom [] RM Glove: []CCL1 []CCL2 []CCL3     4 Gradient Compression Stockings   [x]Custom  []RM Lower Extremity:   []CCL1       [x]CCL2         []CCL3   [x]Knee       []Thigh        []Waist Length x x    Bebo sleeve arm w/ hand, night use    Tribute Wrap, night use      Compression Bra          Compression Vest         The above patient was referred for treatment of Lymphedema due to the diagnosis indicated above. Lymphedema is a progressive and chronic condition. It may cause acute infections, muscle atrophy, discomfort, and connective tissue fibrosis with irreversible tissue damage, decreased ROM in the affected limb and diminished functional mobility possibly interfering with independence and ability to work. Recurrent infections and wound complications that commonly occur with Lymphedema often require hospitalization and extensive wound care, thus increasing medical costs. The patient has received complete decongestive therapy which includes manual lymphatic drainage, lymphedema specific exercises, compression bandaging, and hygiene/skin care. Goals of therapy are to reduce the edema and prevent re-accumulation of fluid with its complications. It is medically necessary for this patient to have daytime garments to control this condition. They will need 2 sets (one set to wear and one set to wash for adequate skin care and wearing time). Garments must be replaced every 4-6 months for effectiveness. There are no substitutes available that offer acceptable compression treatment for this Lymphedema patient. If further information is requested, please contact our certified lymphedema therapists at [phone].     [] Uday Robin, PT, DPT, CLT-CRISTIANA  [x] Pollo Millan, PT, [facility]  [] Molly Shen, PT, DPT, CLT-CRISTIANA  [] Tato Hernandez, PT, DPT, [address]    Printed  Provider Name Deloris Shore. MD Provider Signature  Date    Provider NPI [id number]

## 2023-01-04 VITALS — HEIGHT: 63 IN

## 2023-01-04 PROBLEM — I10 HYPERTENSION: Status: ACTIVE | Noted: 2023-01-04

## 2023-01-04 PROBLEM — Z87.42 HISTORY OF ABNORMAL CERVICAL PAP SMEAR: Status: ACTIVE | Noted: 2023-01-04

## 2023-01-04 PROBLEM — E07.9 THYROID DISORDER: Status: ACTIVE | Noted: 2023-01-04

## 2023-01-09 ENCOUNTER — OFFICE VISIT (OUTPATIENT)
Dept: OBGYN CLINIC | Age: 58
End: 2023-01-09
Payer: COMMERCIAL

## 2023-01-09 VITALS
WEIGHT: 170.25 LBS | SYSTOLIC BLOOD PRESSURE: 138 MMHG | DIASTOLIC BLOOD PRESSURE: 80 MMHG | BODY MASS INDEX: 30.16 KG/M2 | HEIGHT: 63 IN

## 2023-01-09 DIAGNOSIS — Z80.0 FAMILY HISTORY OF COLON CANCER IN MOTHER: ICD-10-CM

## 2023-01-09 DIAGNOSIS — N95.1 MENOPAUSAL SYNDROME: ICD-10-CM

## 2023-01-09 DIAGNOSIS — Z12.4 SCREENING FOR MALIGNANT NEOPLASM OF CERVIX: Primary | ICD-10-CM

## 2023-01-09 DIAGNOSIS — Z01.419 ROUTINE GYNECOLOGICAL EXAMINATION: ICD-10-CM

## 2023-01-09 PROBLEM — Z78.0 MENOPAUSE: Status: ACTIVE | Noted: 2023-01-09

## 2023-01-09 PROCEDURE — 3079F DIAST BP 80-89 MM HG: CPT | Performed by: OBSTETRICS & GYNECOLOGY

## 2023-01-09 PROCEDURE — 99386 PREV VISIT NEW AGE 40-64: CPT | Performed by: OBSTETRICS & GYNECOLOGY

## 2023-01-09 PROCEDURE — 3075F SYST BP GE 130 - 139MM HG: CPT | Performed by: OBSTETRICS & GYNECOLOGY

## 2023-01-09 RX ORDER — HYDROCHLOROTHIAZIDE 12.5 MG/1
TABLET ORAL
COMMUNITY
Start: 2022-11-14

## 2023-01-09 NOTE — PROGRESS NOTES
Louise Hurst is a , 56 y.o. female   No LMP recorded (lmp unknown). (Menstrual status: Menopause). She presents for her annual    She is having no significant problems. Menstrual status:  Cycles are menopausal.    Flow: absent. She does not have dysmenorrhea. Medical conditions:  Since her last annual GYN exam about one year ago, she has not the following changes in her health history: none. Mammogram History:    KIET Results (most recent):  No results found for this or any previous visit. DEXA Results (most recent):  No results found for this or any previous visit. Past Medical History:   Diagnosis Date    History of abnormal cervical Pap smear 2023    Hypertension 2023    Ill-defined condition     Lymphedema     Menopause     Thyroid disease     Thyroid disorder 2023     Past Surgical History:   Procedure Laterality Date    COLPOSCOPY      HX BREAST BIOPSY      HX TONSIL AND ADENOIDECTOMY      HX WISDOM TEETH EXTRACTION      RI LAPAROSCOPY FULGURATION OVIDUCTS         Prior to Admission medications    Medication Sig Start Date End Date Taking? Authorizing Provider   hydroCHLOROthiazide (HYDRODIURIL) 12.5 mg tablet  22  Yes Provider, Historical   levothyroxine sodium (LEVOTHYROXINE PO) Take  by mouth. Yes Provider, Historical       Allergies   Allergen Reactions    Penicillins Rash          Tobacco History:  reports that she has never smoked. She has never used smokeless tobacco.  Alcohol use:  reports that she does not currently use alcohol. Drug use:  reports no history of drug use. Family Medical/Cancer History:   Family History   Problem Relation Age of Onset    Colon Cancer Mother     Diabetes Father     Hypertension Father           Review of Systems   Constitutional:  Negative for chills, fever, malaise/fatigue and weight loss. HENT:  Negative for congestion, ear pain, sinus pain and tinnitus.     Eyes:  Negative for blurred vision and double vision. Respiratory:  Negative for cough, shortness of breath and wheezing. Cardiovascular:  Negative for chest pain and palpitations. Gastrointestinal:  Negative for abdominal pain, blood in stool, constipation, diarrhea, heartburn, nausea and vomiting. Genitourinary:  Negative for dysuria, flank pain, frequency, hematuria and urgency. Musculoskeletal:  Negative for joint pain and myalgias. Skin:  Negative for itching and rash. Neurological:  Negative for dizziness, weakness and headaches. Psychiatric/Behavioral:  Negative for depression, memory loss and suicidal ideas. The patient is not nervous/anxious and does not have insomnia. Physical Exam  Constitutional:       Appearance: Normal appearance. HENT:      Head: Normocephalic and atraumatic. Cardiovascular:      Rate and Rhythm: Normal rate. Heart sounds: Normal heart sounds. Pulmonary:      Effort: Pulmonary effort is normal.      Breath sounds: Normal breath sounds. Chest:   Breasts:     Right: Normal.      Left: Normal.   Abdominal:      General: Abdomen is flat. Palpations: Abdomen is soft. Genitourinary:     General: Normal vulva. Vagina: Normal.      Cervix: Normal.      Uterus: Normal.       Adnexa: Right adnexa normal and left adnexa normal.      Rectum: Normal.      Comments: PAP Obtained  Neurological:      Mental Status: She is alert. Psychiatric:         Mood and Affect: Mood normal.         Behavior: Behavior normal.         Thought Content: Thought content normal.        Visit Vitals  /80 (BP 1 Location: Left upper arm, BP Patient Position: Sitting, BP Cuff Size: Small adult)   Ht 5' 3.3\" (1.608 m)   Wt 170 lb 4 oz (77.2 kg)   LMP  (LMP Unknown)   BMI 29.87 kg/m²         Assessment: Diagnoses and all orders for this visit:    1. Screening for malignant neoplasm of cervix  -     PAP IG, RFX APTIMA HPV ASCUS ([id number])    2.  Routine gynecological examination  -     PAP IG, RFX APTIMA HPV ASCUS ([id number])    3. Menopausal syndrome    4. Family history of colon cancer in mother      Plan: Questions addressed  Counseled re: diet, exercise, healthy lifestyle  Return for Annual  Rec annual mammogram        Follow-up and Dispositions    Return for 1 yr annual, 1 yr mammo.

## 2023-01-09 NOTE — PROGRESS NOTES
Chief Complaint   Patient presents with    New Patient     Annual exam. Mcgbd-     Visit Vitals  /80 (BP 1 Location: Left upper arm, BP Patient Position: Sitting, BP Cuff Size: Small adult)   Ht 5' 3.3\" (1.608 m)   Wt 170 lb 4 oz (77.2 kg)   LMP  (LMP Unknown)   BMI 29.87 kg/m²

## 2023-01-20 ENCOUNTER — APPOINTMENT (OUTPATIENT)
Dept: PHYSICAL THERAPY | Age: 58
End: 2023-01-20

## 2023-03-23 ENCOUNTER — APPOINTMENT (OUTPATIENT)
Dept: PHYSICAL THERAPY | Age: 58
End: 2023-03-23

## 2023-04-27 ENCOUNTER — APPOINTMENT (OUTPATIENT)
Dept: PHYSICAL THERAPY | Age: 58
End: 2023-04-27

## 2024-05-06 DIAGNOSIS — Z12.31 SCREENING MAMMOGRAM FOR BREAST CANCER: Primary | ICD-10-CM

## 2024-05-06 DIAGNOSIS — Z12.31 SCREENING MAMMOGRAM FOR BREAST CANCER: ICD-10-CM
